# Patient Record
Sex: MALE | Race: WHITE | Employment: FULL TIME | URBAN - METROPOLITAN AREA
[De-identification: names, ages, dates, MRNs, and addresses within clinical notes are randomized per-mention and may not be internally consistent; named-entity substitution may affect disease eponyms.]

---

## 2018-03-07 PROBLEM — Z30.09 VASECTOMY EVALUATION: Status: ACTIVE | Noted: 2018-03-07

## 2021-07-15 ENCOUNTER — OFFICE VISIT (OUTPATIENT)
Dept: FAMILY MEDICINE CLINIC | Age: 51
End: 2021-07-15
Payer: COMMERCIAL

## 2021-07-15 ENCOUNTER — HOSPITAL ENCOUNTER (OUTPATIENT)
Dept: LAB | Age: 51
Discharge: HOME | End: 2021-07-15
Payer: COMMERCIAL

## 2021-07-15 VITALS
SYSTOLIC BLOOD PRESSURE: 102 MMHG | HEART RATE: 64 BPM | OXYGEN SATURATION: 97 % | HEIGHT: 69 IN | WEIGHT: 177 LBS | BODY MASS INDEX: 26.22 KG/M2 | TEMPERATURE: 98.3 F | DIASTOLIC BLOOD PRESSURE: 74 MMHG

## 2021-07-15 DIAGNOSIS — Z12.5 SCREENING FOR PROSTATE CANCER: ICD-10-CM

## 2021-07-15 DIAGNOSIS — Z00.00 ROUTINE GENERAL MEDICAL EXAMINATION AT A HEALTH CARE FACILITY: Primary | ICD-10-CM

## 2021-07-15 DIAGNOSIS — D72.829 LEUKOCYTOSIS, UNSPECIFIED TYPE: ICD-10-CM

## 2021-07-15 DIAGNOSIS — K63.89 HEPATIC FLEXURE MASS: ICD-10-CM

## 2021-07-15 DIAGNOSIS — E78.5 HYPERLIPIDEMIA, UNSPECIFIED HYPERLIPIDEMIA TYPE: ICD-10-CM

## 2021-07-15 DIAGNOSIS — Z12.11 SCREEN FOR COLON CANCER: ICD-10-CM

## 2021-07-15 LAB
ALBUMIN SERPL-MCNC: 3.9 G/DL (ref 3.4–5)
ALBUMIN/GLOB SERPL: 1.3 {RATIO} (ref 0.8–1.7)
ALP SERPL-CCNC: 92 U/L (ref 45–117)
ALT SERPL-CCNC: 22 U/L (ref 16–61)
ANION GAP SERPL CALC-SCNC: 5 MMOL/L (ref 3–18)
AST SERPL-CCNC: 16 U/L (ref 10–38)
BASOPHILS # BLD: 0 K/UL (ref 0–0.1)
BASOPHILS NFR BLD: 1 % (ref 0–2)
BILIRUB SERPL-MCNC: 0.6 MG/DL (ref 0.2–1)
BUN SERPL-MCNC: 12 MG/DL (ref 7–18)
BUN/CREAT SERPL: 11 (ref 12–20)
CALCIUM SERPL-MCNC: 8.8 MG/DL (ref 8.5–10.1)
CHLORIDE SERPL-SCNC: 112 MMOL/L (ref 100–111)
CHOLEST SERPL-MCNC: 214 MG/DL
CO2 SERPL-SCNC: 26 MMOL/L (ref 21–32)
CREAT SERPL-MCNC: 1.08 MG/DL (ref 0.6–1.3)
DIFFERENTIAL METHOD BLD: NORMAL
EOSINOPHIL # BLD: 0.2 K/UL (ref 0–0.4)
EOSINOPHIL NFR BLD: 2 % (ref 0–5)
ERYTHROCYTE [DISTWIDTH] IN BLOOD BY AUTOMATED COUNT: 12.9 % (ref 11.6–14.5)
GLOBULIN SER CALC-MCNC: 2.9 G/DL (ref 2–4)
GLUCOSE SERPL-MCNC: 96 MG/DL (ref 74–99)
HCT VFR BLD AUTO: 47.8 % (ref 36–48)
HDLC SERPL-MCNC: 42 MG/DL (ref 40–60)
HDLC SERPL: 5.1 {RATIO} (ref 0–5)
HGB BLD-MCNC: 15.3 G/DL (ref 13–16)
LDLC SERPL CALC-MCNC: 146.6 MG/DL (ref 0–100)
LIPID PROFILE,FLP: ABNORMAL
LYMPHOCYTES # BLD: 2.1 K/UL (ref 0.9–3.6)
LYMPHOCYTES NFR BLD: 33 % (ref 21–52)
MCH RBC QN AUTO: 30.4 PG (ref 24–34)
MCHC RBC AUTO-ENTMCNC: 32 G/DL (ref 31–37)
MCV RBC AUTO: 95 FL (ref 74–97)
MONOCYTES # BLD: 0.5 K/UL (ref 0.05–1.2)
MONOCYTES NFR BLD: 8 % (ref 3–10)
NEUTS SEG # BLD: 3.5 K/UL (ref 1.8–8)
NEUTS SEG NFR BLD: 56 % (ref 40–73)
PLATELET # BLD AUTO: 314 K/UL (ref 135–420)
PMV BLD AUTO: 10.3 FL (ref 9.2–11.8)
POTASSIUM SERPL-SCNC: 4.4 MMOL/L (ref 3.5–5.5)
PROT SERPL-MCNC: 6.8 G/DL (ref 6.4–8.2)
PSA SERPL-MCNC: 3 NG/ML (ref 0–4)
RBC # BLD AUTO: 5.03 M/UL (ref 4.35–5.65)
SODIUM SERPL-SCNC: 143 MMOL/L (ref 136–145)
TRIGL SERPL-MCNC: 127 MG/DL (ref ?–150)
VLDLC SERPL CALC-MCNC: 25.4 MG/DL
WBC # BLD AUTO: 6.2 K/UL (ref 4.6–13.2)

## 2021-07-15 PROCEDURE — 85025 COMPLETE CBC W/AUTO DIFF WBC: CPT

## 2021-07-15 PROCEDURE — 84153 ASSAY OF PSA TOTAL: CPT

## 2021-07-15 PROCEDURE — 99386 PREV VISIT NEW AGE 40-64: CPT | Performed by: INTERNAL MEDICINE

## 2021-07-15 PROCEDURE — 80053 COMPREHEN METABOLIC PANEL: CPT

## 2021-07-15 PROCEDURE — 36415 COLL VENOUS BLD VENIPUNCTURE: CPT

## 2021-07-15 PROCEDURE — 80061 LIPID PANEL: CPT

## 2021-07-15 RX ORDER — ATORVASTATIN CALCIUM 40 MG/1
40 TABLET, FILM COATED ORAL DAILY
Qty: 90 TABLET | Refills: 1 | Status: SHIPPED | OUTPATIENT
Start: 2021-07-15

## 2021-07-15 RX ORDER — LURASIDONE HYDROCHLORIDE 80 MG/1
80 TABLET, FILM COATED ORAL
COMMUNITY
Start: 2021-07-08

## 2021-07-15 NOTE — PROGRESS NOTES
Chief Complaint   Patient presents with    Physical     1. Have you been to the ER, urgent care clinic since your last visit? Hospitalized since your last visit? No    2. Have you seen or consulted any other health care providers outside of the [address] [place] [address] since your last visit? Include any pap smears or colon screening.  No     Health Maintenance Due   Topic Date Due    Hepatitis C Screening  Never done    COVID-19 Vaccine (1) Never done    DTaP/Tdap/Td series (1 - Tdap) Never done    Colorectal Cancer Screening Combo  Never done    Lipid Screen  08/05/2020

## 2021-07-15 NOTE — PROGRESS NOTES
HISTORY OF PRESENT ILLNESS  Shyanne Pacheco is a 53 y.o. male Presents today for a complete physical and preventative medicine exam.  Past medical history is significant for: Kidney stone and hyperlipidemia hepatic mass  Last colonoscopy never  Last eye examination last year  Last dental exam last year  Last PSA never      Physical  The history is provided by the patient and medical records. No Known Allergies  Current Outpatient Medications on File Prior to Visit   Medication Sig Dispense Refill    Latuda 80 mg tab tablet Take 80 mg by mouth daily (with breakfast).  methylphenidate HCl (RITALIN) 20 mg tablet take 1 tablet by mouth three times a day if needed  0    lamoTRIgine (LaMICtaL) 200 mg tablet Take 200 mg by mouth four (4) times daily. No current facility-administered medications on file prior to visit.      Past Medical History:   Diagnosis Date    ADHD (attention deficit hyperactivity disorder)     [facility] ([facility]     Encounter for vasectomy     Hydronephrosis     Kidney stones     Stroke ([facility]     Ureterolithiasis      Past Surgical History:   Procedure Laterality Date    HX COLONOSCOPY          HX VASECTOMY Bilateral 03/15/2018    Dr.Brugh MARC     Family History   Problem Relation Age of Onset    Hypertension Mother     Stroke Father     Cancer Father         thyroid    Heart Failure Father     Cancer Paternal Grandfather         colon     Social History     Socioeconomic History    Marital status:      Spouse name: Not on file    Number of children: Not on file    Years of education: Not on file    Highest education level: Not on file   Occupational History    Occupation:    Tobacco Use    Smoking status: Former Smoker     Quit date: 2005     Years since quittin.5    Smokeless tobacco: Never Used   Vaping Use    Vaping Use: Never used   Substance and Sexual Activity    Alcohol use: No     Comment: occ    Drug use: No    Sexual activity: Yes     Partners: Female     Birth control/protection: Condom   Other Topics Concern    Not on file   Social History Narrative    Not on file     Social Determinants of Health     Financial Resource Strain:     Difficulty of Paying Living Expenses:    Food Insecurity:     Worried About Running Out of Food in the Last Year:     [address] in the Last Year:    Transportation Needs:     Lack of Transportation (Medical):  Lack of Transportation (Non-Medical):    Physical Activity:     Days of Exercise per Week:     Minutes of Exercise per Session:    Stress:     Feeling of Stress :    Social Connections:     Frequency of Communication with Friends and Family:     Frequency of Social Gatherings with Friends and Family:     Attends [employer] Services:     Active Member of Clubs or Organizations:     Attends Club or Organization Meetings:     Marital Status:    Intimate Partner Violence:     Fear of Current or Ex-Partner:     Emotionally Abused:     Physically Abused:     Sexually Abused:          Review of Systems   Constitutional: Negative. Eyes: Negative. Respiratory: Negative. Cardiovascular: Negative. Musculoskeletal: Negative. Neurological: Negative. Endo/Heme/Allergies: Negative. Psychiatric/Behavioral: Negative. Visit Vitals  /74   Pulse 64   Temp 98.3 °F (36.8 °C) (Temporal)   Ht 5' 9\" (1.753 m)   Wt 177 lb (80.3 kg)   SpO2 97%   BMI 26.14 kg/m²       Physical Exam  Vitals and nursing note reviewed. Constitutional:       Appearance: He is well-developed. HENT:      Head: Normocephalic and atraumatic. Right Ear: External ear normal.      Left Ear: External ear normal.      Nose: Nose normal.      Mouth/Throat:      Pharynx: No oropharyngeal exudate. Eyes:      General: No scleral icterus. Right eye: No discharge. Left eye: No discharge.       Conjunctiva/sclera: Conjunctivae normal.      Pupils: Pupils are equal, round, and reactive to light. Neck:      Thyroid: No thyromegaly. Vascular: No JVD. Trachea: No tracheal deviation. Cardiovascular:      Rate and Rhythm: Normal rate and regular rhythm. Heart sounds: Normal heart sounds. No murmur heard. No friction rub. No gallop. Pulmonary:      Effort: Pulmonary effort is normal. No respiratory distress. Breath sounds: Normal breath sounds. No wheezing or rales. Chest:      Chest wall: No tenderness. Abdominal:      General: Bowel sounds are normal. There is no distension. Palpations: Abdomen is soft. There is no mass. Tenderness: There is no abdominal tenderness. There is no guarding or rebound. Musculoskeletal:         General: No tenderness or deformity. Normal range of motion. Cervical back: Normal range of motion and neck supple. Lymphadenopathy:      Cervical: No cervical adenopathy. Skin:     General: Skin is warm and dry. Coloration: Skin is not pale. Findings: No erythema or rash. Neurological:      Mental Status: He is alert and oriented to person, place, and time. Cranial Nerves: No cranial nerve deficit. Coordination: Coordination normal.   Psychiatric:         Behavior: Behavior normal.         Thought Content: Thought content normal.         Judgment: Judgment normal.         ASSESSMENT and PLAN    ICD-10-CM ICD-9-CM    1. Routine general medical examination at a health care facility  Z00.00 V70.0    2. Hepatic flexure mass  K63.89 569.89 MRI ABD W WO CONT   3. Screening for prostate cancer  Z12.5 V76.44 PSA SCREENING (SCREENING)   4. Screen for colon cancer  Z12.11 V76.51 REFERRAL TO GASTROENTEROLOGY   5. Hyperlipidemia, unspecified hyperlipidemia type  E78.5 272.4 LIPID PANEL   6. Leukocytosis, unspecified type  D72.829 288.60 CBC WITH AUTOMATED DIFF     Follow-up and Dispositions    · Return in about 6 weeks (around 8/26/2021).

## 2022-03-19 PROBLEM — Z30.09 VASECTOMY EVALUATION: Status: ACTIVE | Noted: 2018-03-07

## 2022-04-07 ENCOUNTER — HOSPITAL ENCOUNTER (OUTPATIENT)
Dept: LAB | Age: 52
Discharge: HOME | End: 2022-04-07
Payer: COMMERCIAL

## 2022-04-07 ENCOUNTER — OFFICE VISIT (OUTPATIENT)
Dept: FAMILY MEDICINE CLINIC | Age: 52
End: 2022-04-07
Payer: COMMERCIAL

## 2022-04-07 VITALS
BODY MASS INDEX: 26.57 KG/M2 | WEIGHT: 179.4 LBS | RESPIRATION RATE: 18 BRPM | HEART RATE: 75 BPM | HEIGHT: 69 IN | SYSTOLIC BLOOD PRESSURE: 118 MMHG | TEMPERATURE: 98.2 F | DIASTOLIC BLOOD PRESSURE: 77 MMHG | OXYGEN SATURATION: 97 %

## 2022-04-07 DIAGNOSIS — E78.5 HYPERLIPIDEMIA, UNSPECIFIED HYPERLIPIDEMIA TYPE: Primary | ICD-10-CM

## 2022-04-07 DIAGNOSIS — K63.89 HEPATIC FLEXURE MASS: ICD-10-CM

## 2022-04-07 DIAGNOSIS — F31.81 BIPOLAR 2 DISORDER (HCC): ICD-10-CM

## 2022-04-07 DIAGNOSIS — Z12.11 COLON CANCER SCREENING: ICD-10-CM

## 2022-04-07 DIAGNOSIS — E78.5 HYPERLIPIDEMIA, UNSPECIFIED HYPERLIPIDEMIA TYPE: ICD-10-CM

## 2022-04-07 PROBLEM — Z30.09 VASECTOMY EVALUATION: Status: RESOLVED | Noted: 2018-03-07 | Resolved: 2022-04-07

## 2022-04-07 LAB
ALBUMIN SERPL-MCNC: 3.9 G/DL (ref 3.4–5)
ALBUMIN/GLOB SERPL: 1.3 {RATIO} (ref 0.8–1.7)
ALP SERPL-CCNC: 82 U/L (ref 45–117)
ALT SERPL-CCNC: 20 U/L (ref 16–61)
ANION GAP SERPL CALC-SCNC: 7 MMOL/L (ref 3–18)
AST SERPL-CCNC: 16 U/L (ref 10–38)
BILIRUB SERPL-MCNC: 0.4 MG/DL (ref 0.2–1)
BUN SERPL-MCNC: 12 MG/DL (ref 7–18)
BUN/CREAT SERPL: 10 (ref 12–20)
CALCIUM SERPL-MCNC: 9.4 MG/DL (ref 8.5–10.1)
CHLORIDE SERPL-SCNC: 104 MMOL/L (ref 100–111)
CHOLEST SERPL-MCNC: 191 MG/DL
CO2 SERPL-SCNC: 28 MMOL/L (ref 21–32)
CREAT SERPL-MCNC: 1.18 MG/DL (ref 0.6–1.3)
GLOBULIN SER CALC-MCNC: 3 G/DL (ref 2–4)
GLUCOSE SERPL-MCNC: 75 MG/DL (ref 74–99)
HDLC SERPL-MCNC: 41 MG/DL (ref 40–60)
HDLC SERPL: 4.7 {RATIO} (ref 0–5)
LDLC SERPL CALC-MCNC: 110.8 MG/DL (ref 0–100)
LIPID PROFILE,FLP: ABNORMAL
POTASSIUM SERPL-SCNC: 4.2 MMOL/L (ref 3.5–5.5)
PROT SERPL-MCNC: 6.9 G/DL (ref 6.4–8.2)
SODIUM SERPL-SCNC: 139 MMOL/L (ref 136–145)
TRIGL SERPL-MCNC: 196 MG/DL (ref ?–150)
VLDLC SERPL CALC-MCNC: 39.2 MG/DL

## 2022-04-07 PROCEDURE — 80053 COMPREHEN METABOLIC PANEL: CPT

## 2022-04-07 PROCEDURE — 99214 OFFICE O/P EST MOD 30 MIN: CPT | Performed by: NURSE PRACTITIONER

## 2022-04-07 PROCEDURE — 36415 COLL VENOUS BLD VENIPUNCTURE: CPT

## 2022-04-07 PROCEDURE — 80061 LIPID PANEL: CPT

## 2022-04-07 NOTE — PROGRESS NOTES
[address] [place] [address]               [phone]      Harini Marcelo is a 49 y.o. male and presents with Establish Care       Assessment/Plan:    Diagnoses and all orders for this visit:    1. Hyperlipidemia, unspecified hyperlipidemia type  -     METABOLIC PANEL, COMPREHENSIVE; Future  -     LIPID PANEL; Future   Endorses medication compliance, Follow-up labs today and Denies abdominal pain or symptoms of myalgia   2. Hepatic flexure mass  -     CT ABD W CONT; Future  -     REFERRAL TO GASTROENTEROLOGY  Incidental finding on a prior CAT scan, will get repeat CAT scan to follow-up and refer to gastroenterology for further evaluation in addition to colon cancer screening  3. Colon cancer screening  -     REFERRAL TO GASTROENTEROLOGY    4. Bipolar 2 disorder ([facility]  Assessment & Plan:   monitored by specialist. No acute findings meriting change in the plan      Visit today to establish care, prior patient of Dr. Mary Grace Nice  Follow-up and Dispositions    · Return in about 4 months (around 8/7/2022) for CPE, 30 min, office only. Health Maintenance:   Health Maintenance   Topic Date Due    Hepatitis C Screening  Never done    DTaP/Tdap/Td series (1 - Tdap) Never done    Colorectal Cancer Screening Combo  Never done    Shingrix Vaccine Age 50> (1 of 2) Never done    COVID-19 Vaccine (3 - Booster for [employer] series) 03/01/2022    Flu Vaccine (Season Ended) 09/01/2022    Depression Monitoring  04/07/2023    Lipid Screen  04/07/2023    Pneumococcal 0-64 years  Aged Out        Subjective:    Labs obtained prior to visit? NO  Reviewed with patient?  Not applicable    Weak urine stream  Was concerned of prostate cancer  Last PSA 3.0  PMH: kidney stones x2, last was a year ago  Onset: about 2 months ago  The problem resolved on its own  Denies having pain or discomfot with urination at the time    Foreign object in foot  Stepped on glass about a month ago  Thought he got it out but feels like something is still there  Advised he should go to urgent care as we do not do procedures in our office    Hepatic mass  Found incidentally on ct scan in 2019  Needs follow up  Denies abdominal pain or changes in bowel habits  Get ct scan and refer to gi      ROS:     ROS  As stated in HPI, otherwise all others negative. The problem list was updated as a part of today's visit. Patient Active Problem List   Diagnosis Code    TIA (transient ischemic attack) G45.9    ADHD (attention deficit hyperactivity disorder) F90.9    [facility] ([facility] F31.81       The PSH, FH were reviewed. SH:  Social History     Tobacco Use    Smoking status: Former Smoker     Quit date: 2005     Years since quittin.3    Smokeless tobacco: Never Used   Vaping Use    Vaping Use: Never used   Substance Use Topics    Alcohol use: No     Comment: occ    Drug use: No       Medications/Allergies:  Current Outpatient Medications on File Prior to Visit   Medication Sig Dispense Refill    Latuda 80 mg tab tablet Take 80 mg by mouth daily (with breakfast).  methylphenidate HCl (RITALIN) 20 mg tablet take 1 tablet by mouth three times a day if needed  0    lamoTRIgine (LaMICtaL) 200 mg tablet Take 200 mg by mouth four (4) times daily.  atorvastatin (LIPITOR) 40 mg tablet Take 1 Tablet by mouth daily. (Patient not taking: Reported on 2022) 90 Tablet 1     No current facility-administered medications on file prior to visit. No Known Allergies    Objective:  Visit Vitals  /77 (BP 1 Location: Right arm, BP Patient Position: Sitting, BP Cuff Size: Small adult) Comment (BP Patient Position): feet flat on the floor   Pulse 75   Temp 98.2 °F (36.8 °C) (Temporal)   Resp 18   Ht 5' 9\" (1.753 m)   Wt 179 lb 6.4 oz (81.4 kg)   SpO2 97%   BMI 26.49 kg/m²    Body mass index is 26.49 kg/m². Physical assessment  Physical Exam  Vitals and nursing note reviewed.    Eyes: Conjunctiva/sclera: Conjunctivae normal.      Pupils: Pupils are equal, round, and reactive to light. Cardiovascular:      Rate and Rhythm: Normal rate and regular rhythm. Heart sounds: Normal heart sounds. No murmur heard. No friction rub. No gallop. Pulmonary:      Effort: Pulmonary effort is normal.      Breath sounds: Normal breath sounds. Musculoskeletal:         General: Normal range of motion. Cervical back: Normal range of motion. Skin:     General: Skin is warm and dry. Neurological:      Mental Status: He is alert. Labwork and Ancillary Studies:    CBC w/Diff  Lab Results   Component Value Date/Time    WBC 6.2 07/15/2021 09:38 AM    HGB 15.3 07/15/2021 09:38 AM    PLATELET 539 19/29/4783 09:38 AM         Basic Metabolic Profile  Lab Results   Component Value Date/Time    Sodium 139 04/07/2022 11:14 AM    Potassium 4.2 04/07/2022 11:14 AM    Chloride 104 04/07/2022 11:14 AM    CO2 28 04/07/2022 11:14 AM    Anion gap 7 04/07/2022 11:14 AM    Glucose 75 04/07/2022 11:14 AM    BUN 12 04/07/2022 11:14 AM    Creatinine 1.18 04/07/2022 11:14 AM    BUN/Creatinine ratio 10 (L) 04/07/2022 11:14 AM    GFR est AA >60 04/07/2022 11:14 AM    GFR est non-AA >60 04/07/2022 11:14 AM    Calcium 9.4 04/07/2022 11:14 AM        Cholesterol  Lab Results   Component Value Date/Time    Cholesterol, total 191 04/07/2022 11:14 AM    HDL Cholesterol 41 04/07/2022 11:14 AM    LDL, calculated 110.8 (H) 04/07/2022 11:14 AM    Triglyceride 196 (H) 04/07/2022 11:14 AM    CHOL/HDL Ratio 4.7 04/07/2022 11:14 AM           I have discussed the diagnosis with the patient and the intended plan as seen in the above orders. The patient has received an After-Visit Summary and questions were answered concerning future plans. An After Visit Summary was printed and given to the patient. All diagnosis have been discussed with the patient and all of the patient's questions have been answered.      Follow-up and Dispositions    · Return in about 4 months (around 8/7/2022) for CPE, 30 min, office only. Judith Montejo, [facility]-BC  [address] [facility]   [address] [facility] [address]  [address]

## 2022-04-07 NOTE — PROGRESS NOTES
Room 8    Patient has no open referrals    Patient states I would like to discuss referral to Liver specialist .     Did patient bring someone? No    Did the patient have DME equipment? No    Did you take your medication today? Yes      1. \"Have you been to the ER, urgent care clinic since your last visit? Hospitalized since your last visit? \" No    2. \"Have you seen or consulted any other health care providers outside of the [address] [place] [address] since your last visit? \" No     3. For patients aged 39-70: Has the patient had a colonoscopy / FIT/ Cologuard? No      If the patient is female:    4. For patients aged 41-77: Has the patient had a mammogram within the past 2 years? No      5. For patients aged 21-65: Has the patient had a pap smear?  No        3 most recent PHQ Screens 4/7/2022   PHQ Not Done -   Little interest or pleasure in doing things Not at all   Feeling down, depressed, irritable, or hopeless Not at all   Total Score PHQ 2 0         Health Maintenance Due   Topic Date Due    Hepatitis C Screening  Never done    COVID-19 Vaccine (1) Never done    DTaP/Tdap/Td series (1 - Tdap) Never done    Colorectal Cancer Screening Combo  Never done    Shingrix Vaccine Age 50> (1 of 2) Never done       Learning Assessment 8/5/2015   PRIMARY LEARNER Patient   HIGHEST LEVEL OF EDUCATION - PRIMARY LEARNER  4 YEARS OF COLLEGE   PRIMARY LANGUAGE ENGLISH   LEARNER PREFERENCE PRIMARY READING   ANSWERED BY patient   RELATIONSHIP SELF

## 2022-04-11 PROBLEM — Z30.09 VASECTOMY EVALUATION: Status: RESOLVED | Noted: 2018-03-07 | Resolved: 2022-04-07

## 2022-04-11 PROBLEM — F31.81 BIPOLAR 2 DISORDER (HCC): Status: ACTIVE | Noted: 2022-04-07

## 2022-04-15 ENCOUNTER — TELEPHONE (OUTPATIENT)
Dept: FAMILY MEDICINE CLINIC | Age: 52
End: 2022-04-15

## 2022-04-20 ENCOUNTER — PATIENT MESSAGE (OUTPATIENT)
Dept: FAMILY MEDICINE CLINIC | Age: 52
End: 2022-04-20

## 2022-05-16 ENCOUNTER — DOCUMENTATION ONLY (OUTPATIENT)
Dept: FAMILY MEDICINE CLINIC | Age: 52
End: 2022-05-16

## 2022-05-16 ENCOUNTER — TELEPHONE (OUTPATIENT)
Dept: FAMILY MEDICINE CLINIC | Age: 52
End: 2022-05-16

## 2022-05-16 ENCOUNTER — OFFICE VISIT (OUTPATIENT)
Dept: FAMILY MEDICINE CLINIC | Age: 52
End: 2022-05-16
Payer: COMMERCIAL

## 2022-05-16 VITALS
SYSTOLIC BLOOD PRESSURE: 122 MMHG | OXYGEN SATURATION: 98 % | RESPIRATION RATE: 18 BRPM | TEMPERATURE: 97.6 F | WEIGHT: 177 LBS | BODY MASS INDEX: 26.22 KG/M2 | HEART RATE: 72 BPM | DIASTOLIC BLOOD PRESSURE: 76 MMHG | HEIGHT: 69 IN

## 2022-05-16 DIAGNOSIS — R07.89 COSTOCHONDRAL CHEST PAIN: Primary | ICD-10-CM

## 2022-05-16 PROCEDURE — 99213 OFFICE O/P EST LOW 20 MIN: CPT | Performed by: NURSE PRACTITIONER

## 2022-05-16 NOTE — PROGRESS NOTES
[address] [place] [address]               [phone]      Elaina Baez is a 49 y.o. male and presents with Hospital Follow Up and Chest Pain       Assessment/Plan:    Diagnoses and all orders for this visit:    1. Costochondral chest pain    went to ED for this pain and had negative cardiac workup  Signs and symptoms are consistent with costochondritis  Advised to use OTC NSAIDS for his pain and hand out on stretches and exercises to perform at home provided  Patient verbalized understanding and is in agreement with this plan of care    Follow-up and Dispositions    · Return for keep previously scheduled follow up. Health Maintenance:   Health Maintenance   Topic Date Due    Hepatitis C Screening  Never done    DTaP/Tdap/Td series (1 - Tdap) Never done    Colorectal Cancer Screening Combo  Never done    Shingrix Vaccine Age 50> (1 of 2) Never done    COVID-19 Vaccine (3 - Booster for [employer] series) 03/01/2022    Flu Vaccine (Season Ended) 09/01/2022    Depression Monitoring  04/07/2023    Lipid Screen  04/07/2023    Pneumococcal 0-64 years  Aged Out        Subjective:    Labs obtained prior to visit? NO  Reviewed with patient? Not applicable    Chest pain  Ongoing for about 3 weeks  Has called EMS for this problem and was taken to ED  While there he had testing for MI and was told he was negative (records not available at this time)  Went to urgent care this morning for the same problem  Had EKG and CXR: both were negative for abnormalities  Recommended stress reduciton techniques    Wants to find out why his chest is hurting all the time   States the pain is mid-sternal: varies from a pain to a pressure  The pain is worse if he is winded and takes a deep breath        ROS:     ROS  As stated in HPI, otherwise all others negative. The problem list was updated as a part of today's visit.   Patient Active Problem List   Diagnosis Code    TIA (transient ischemic attack) G45.9    ADHD (attention deficit hyperactivity disorder) F90.9    Bipolar 2 disorder ([facility]) F31.81       The PSH, FH were reviewed. SH:  Social History     Tobacco Use    Smoking status: Former Smoker     Quit date: 2005     Years since quittin.4    Smokeless tobacco: Never Used   Vaping Use    Vaping Use: Never used   Substance Use Topics    Alcohol use: No     Comment: occ    Drug use: No       Medications/Allergies:  Current Outpatient Medications on File Prior to Visit   Medication Sig Dispense Refill    Latuda 80 mg tab tablet Take 80 mg by mouth daily (with breakfast).  methylphenidate HCl (RITALIN) 20 mg tablet take 1 tablet by mouth three times a day if needed  0    lamoTRIgine (LaMICtaL) 200 mg tablet Take 200 mg by mouth four (4) times daily.  atorvastatin (LIPITOR) 40 mg tablet Take 1 Tablet by mouth daily. (Patient not taking: Reported on 2022) 90 Tablet 1     No current facility-administered medications on file prior to visit. No Known Allergies    Objective:  Visit Vitals  /76 (BP 1 Location: Left arm, BP Patient Position: Sitting, BP Cuff Size: Adult)   Pulse 72   Temp 97.6 °F (36.4 °C) (Temporal)   Resp 18   Ht 5' 9\" (1.753 m)   Wt 177 lb (80.3 kg)   SpO2 98%   BMI 26.14 kg/m²    Body mass index is 26.14 kg/m². Physical assessment  Physical Exam  Vitals and nursing note reviewed. Eyes:      Conjunctiva/sclera: Conjunctivae normal.      Pupils: Pupils are equal, round, and reactive to light. Cardiovascular:      Rate and Rhythm: Normal rate and regular rhythm. Heart sounds: Normal heart sounds. No murmur heard. No friction rub. No gallop. Pulmonary:      Effort: Pulmonary effort is normal.      Breath sounds: Normal breath sounds. Musculoskeletal:         General: Normal range of motion. Cervical back: Normal range of motion. Skin:     General: Skin is warm and dry.    Neurological:      Mental Status: He is alert. Labwork and Ancillary Studies:    CBC w/Diff  Lab Results   Component Value Date/Time    WBC 6.2 07/15/2021 09:38 AM    HGB 15.3 07/15/2021 09:38 AM    PLATELET 194 58/51/0591 09:38 AM         Basic Metabolic Profile  Lab Results   Component Value Date/Time    Sodium 139 04/07/2022 11:14 AM    Potassium 4.2 04/07/2022 11:14 AM    Chloride 104 04/07/2022 11:14 AM    CO2 28 04/07/2022 11:14 AM    Anion gap 7 04/07/2022 11:14 AM    Glucose 75 04/07/2022 11:14 AM    BUN 12 04/07/2022 11:14 AM    Creatinine 1.18 04/07/2022 11:14 AM    BUN/Creatinine ratio 10 (L) 04/07/2022 11:14 AM    GFR est AA >60 04/07/2022 11:14 AM    GFR est non-AA >60 04/07/2022 11:14 AM    Calcium 9.4 04/07/2022 11:14 AM        Cholesterol  Lab Results   Component Value Date/Time    Cholesterol, total 191 04/07/2022 11:14 AM    HDL Cholesterol 41 04/07/2022 11:14 AM    LDL, calculated 110.8 (H) 04/07/2022 11:14 AM    Triglyceride 196 (H) 04/07/2022 11:14 AM    CHOL/HDL Ratio 4.7 04/07/2022 11:14 AM           I have discussed the diagnosis with the patient and the intended plan as seen in the above orders. The patient has received an After-Visit Summary and questions were answered concerning future plans. An After Visit Summary was printed and given to the patient. All diagnosis have been discussed with the patient and all of the patient's questions have been answered. Follow-up and Dispositions    · Return for keep previously scheduled follow up. Faraz Charles, AGNP-BC  [address] [facility]   [address] [facility] [address]  [address]

## 2022-05-16 NOTE — PROGRESS NOTES
Room 9    Patient states my chest is bother me for the past 2-3 weeks     Patient states when I called the Ambulance to my home my blood pressure was roughly 122/110. Did patient bring someone? No    Did the patient have DME equipment? No     Did you take your medication today? Yes       1. \"Have you been to the ER, urgent care clinic since your last visit? Hospitalized since your last visit? \" Yes Patient states I went to [facility] for chest pain     2. \"Have you seen or consulted any other health care providers outside of the [address] [place] [address] since your last visit? \" No     3. For patients aged 39-70: Has the patient had a colonoscopy / FIT/ Cologuard? No      If the patient is female:    4. For patients aged 41-77: Has the patient had a mammogram within the past 2 years? NA - based on age or sex      11. For patients aged 21-65: Has the patient had a pap smear?  NA - based on age or sex        3 most recent PHQ Screens 4/7/2022   PHQ Not Done -   Little interest or pleasure in doing things Not at all   Feeling down, depressed, irritable, or hopeless Not at all   Total Score PHQ 2 0         Health Maintenance Due   Topic Date Due    Hepatitis C Screening  Never done    DTaP/Tdap/Td series (1 - Tdap) Never done    Colorectal Cancer Screening Combo  Never done    Shingrix Vaccine Age 50> (1 of 2) Never done    COVID-19 Vaccine (3 - Booster for [employer] series) 03/01/2022       Learning Assessment 8/5/2015   PRIMARY LEARNER Patient   HIGHEST LEVEL OF EDUCATION - PRIMARY LEARNER  4 YEARS OF COLLEGE   PRIMARY LANGUAGE ENGLISH   LEARNER PREFERENCE PRIMARY READING   ANSWERED BY patient   RELATIONSHIP SELF

## 2022-05-16 NOTE — PATIENT INSTRUCTIONS
Costochondritis: Care Instructions  Your Care Instructions  You have chest pain because the cartilage of your rib cage is inflamed. This problem is called costochondritis. This type of chest wall pain may last from days to weeks. It is not a heart problem. Sometimes costochondritis occurs with a cold or the flu, and other times the exact cause is not known. Follow-up care is a key part of your treatment and safety. Be sure to make and go to all appointments, and call your doctor if you are having problems. It's also a good idea to know your test results and keep a list of the medicines you take. How can you care for yourself at home? · Take medicines for pain and inflammation exactly as directed. ? If the doctor gave you a prescription medicine, take it as prescribed. ? If you are not taking a prescription pain medicine, ask your doctor if you can take an over-the-counter medicine. ? Do not take two or more pain medicines at the same time unless the doctor told you to. Many pain medicines have acetaminophen, which is Tylenol. Too much acetaminophen (Tylenol) can be harmful. · It may help to use a warm compress or heating pad (set on low) on your chest. You can also try alternating heat and ice. Put ice or a cold pack on the area for 10 to 20 minutes at a time. Put a thin cloth between the ice and your skin. · Avoid any activity that strains the chest area. As your pain gets better, you can slowly return to your normal activities. · Do not use tape, an elastic bandage, a \"rib belt,\" or anything else that restricts your chest wall motion. When should you call for help? Call 911 anytime you think you may need emergency care. For example, call if:    · You have new or different chest pain or pressure. This may occur with:  ? Sweating. ? Shortness of breath. ? Nausea or vomiting. ? Pain that spreads from the chest to the neck, jaw, or one or both shoulders or arms.   ? Dizziness or lightheadedness. ? A fast or uneven pulse. After calling 911, chew 1 adult-strength aspirin. Wait for an ambulance. Do not try to drive yourself.     · You have severe trouble breathing. Call your doctor now or seek immediate medical care if:    · You have a fever or cough.     · You have any trouble breathing.     · Your chest pain gets worse. Watch closely for changes in your health, and be sure to contact your doctor if:    · Your chest pain continues even though you are taking anti-inflammatory medicine.     · Your chest wall pain has not improved after 5 to 7 days. Where can you learn more? Go to http://www.gray.com/  Enter [id number] in the search box to learn more about \"Costochondritis: Care Instructions. \"  Current as of: July 1, 2021               Content Version: 13.2  © 7785-6478 [employer]. Care instructions adapted under license by [employer] (which disclaims liability or warranty for this information). If you have questions about a medical condition or this instruction, always ask your healthcare professional. [place] [address] any warranty or liability for your use of this information. Shoulder Stretches: Exercises  Introduction  Here are some examples of exercises for you to try. The exercises may be suggested for a condition or for rehabilitation. Start each exercise slowly. Ease off the exercises if you start to have pain. You will be told when to start these exercises and which ones will work best for you. How to do the exercises  Shoulder stretch    1.  a doorway and place one arm against the door frame. Your elbow should be a little higher than your shoulder. 2. Relax your shoulders as you lean forward, allowing your chest and shoulder muscles to stretch. You can also turn your body slightly away from your arm to stretch the muscles even more. 3. Hold for 15 to 30 seconds.   4. Repeat 2 to 4 times with each arm. Shoulder and chest stretch    1. Shoulder and chest stretch  2. While sitting, relax your upper body so you slump slightly in your chair. 3. As you breathe in, straighten your back and open your arms out to the sides. 4. Gently pull your shoulder blades back and downward. 5. Hold for 15 to 30 seconds as your breathe normally. 6. Repeat 2 to 4 times. Overhead stretch    1. Reach up over your head with both arms. 2. Hold for 15 to 30 seconds. 3. Repeat 2 to 4 times. Follow-up care is a key part of your treatment and safety. Be sure to make and go to all appointments, and call your doctor if you are having problems. It's also a good idea to know your test results and keep a list of the medicines you take. Where can you learn more? Go to http://www.gray.com/  Enter S254 in the search box to learn more about \"Shoulder Stretches: Exercises. \"  Current as of: July 1, 2021               Content Version: 13.2  © 2006-2022 [employer]. Care instructions adapted under license by [employer] (which disclaims liability or warranty for this information). If you have questions about a medical condition or this instruction, always ask your healthcare professional. [address] any warranty or liability for your use of this information.

## 2022-08-22 DIAGNOSIS — K63.89 HEPATIC FLEXURE MASS: ICD-10-CM

## 2022-12-05 ENCOUNTER — OFFICE VISIT (OUTPATIENT)
Dept: FAMILY MEDICINE CLINIC | Age: 52
End: 2022-12-05
Payer: COMMERCIAL

## 2022-12-05 VITALS
DIASTOLIC BLOOD PRESSURE: 82 MMHG | HEIGHT: 69 IN | HEART RATE: 101 BPM | BODY MASS INDEX: 27.25 KG/M2 | RESPIRATION RATE: 18 BRPM | TEMPERATURE: 98.4 F | WEIGHT: 184 LBS | OXYGEN SATURATION: 100 % | SYSTOLIC BLOOD PRESSURE: 110 MMHG

## 2022-12-05 DIAGNOSIS — R05.3 CHRONIC COUGH: Primary | ICD-10-CM

## 2022-12-05 DIAGNOSIS — R07.89 CHEST WALL PAIN: ICD-10-CM

## 2022-12-05 PROBLEM — E78.5 HYPERLIPIDEMIA: Status: ACTIVE | Noted: 2022-12-05

## 2022-12-05 PROBLEM — E78.5 HYPERLIPIDEMIA: Status: RESOLVED | Noted: 2022-12-05 | Resolved: 2022-12-05

## 2022-12-05 PROCEDURE — 99214 OFFICE O/P EST MOD 30 MIN: CPT | Performed by: NURSE PRACTITIONER

## 2022-12-05 PROCEDURE — 93000 ELECTROCARDIOGRAM COMPLETE: CPT | Performed by: NURSE PRACTITIONER

## 2022-12-05 RX ORDER — ALBUTEROL SULFATE 90 UG/1
2 AEROSOL, METERED RESPIRATORY (INHALATION)
Qty: 18 G | Refills: 2 | Status: SHIPPED | OUTPATIENT
Start: 2022-12-05

## 2022-12-05 NOTE — PROGRESS NOTES
[address] [place], [place] [address] [address]               [phone]      Chaim Jon is a 47 y.o. male and presents with Physical       Assessment/Plan:    Diagnoses and all orders for this visit:    1. Chronic cough  -     XR CHEST PA LAT; Future  -     REFERRAL TO PULMONARY DISEASE  -     albuterol (PROVENTIL HFA, VENTOLIN HFA, PROAIR HFA) 90 mcg/actuation inhaler; Take 2 Puffs by inhalation every four (4) hours as needed for Wheezing. Check cxr, refer to pulmonary, signs and symptoms are consistent with possible asthma, order for inhaler provided  2. Chest wall pain  -     AMB POC EKG ROUTINE W/ 12 LEADS, INTER & REP  -     REFERRAL TO CARDIOLOGY  -     TROPONIN-HIGH SENSITIVITY; Future  12 leasd show NSR, to ST or T wave abnormalities: reviewed by me  Check troponin-he is having some chest discomfort now  Refer to cardiology    Labs at visit  Follow-up and Dispositions    Return in about 3 months (around 3/5/2023) for CPE, 30 min, office only. Health Maintenance:   Health Maintenance   Topic Date Due    DTaP/Tdap/Td series (1 - Tdap) Never done    Shingrix Vaccine Age 50> (1 of 2) Never done    COVID-19 Vaccine (3 - Booster for Pfizer series) 11/26/2021    Flu Vaccine (1) Never done    Lipid Screen  04/07/2023    Depression Monitoring  12/05/2023    Colorectal Cancer Screening Combo  06/27/2032    Hepatitis C Screening  Completed    Pneumococcal 0-64 years  Aged Out        Subjective:    Labs obtained prior to visit? NO  Reviewed with patient? Not applicable    Chronic cough  Onset: 6 months ago  Characteristics: happens after going up a flight of stairs, usually lasts for a short period of time but at times will last for an hour. Used to be a runner about a year ago and now cannot go up a flight of stairs without being winded. The cough is dry and non productive. Denies any phlegm or bloody sputum.   Denies sob at any other times, changes in weather makes no difference in his cough  PMH: 12 pack year history of cigarette smoking, and about 10 year history of casual smoking of 1-2 packs a month  Went to ED 10/2021 for chest pain work up negative for cardiac findings    Chest pain  Continues to have chest pain, located to left upper chest, the problem is intermittent  Started having chest pain this morning around 6-7 am when he woke up, he woke up and the pain was there, the pain did not wake him up  The pain is dull and localized, does not feel sob at this time    Hepatic flexure mass  Went to GI  Had colonoscopy and ct abd   Colonoscopy in 10 year  Ct scan showed mass unchanged and no follow up needed    ROS:     ROS  As stated in HPI, otherwise all others negative. The problem list was updated as a part of today's visit. Patient Active Problem List   Diagnosis Code    TIA (transient ischemic attack) G45.9    ADHD (attention deficit hyperactivity disorder) F90.9    Bipolar 2 disorder (HCC) F31.81       The PSH, FH were reviewed. SH:  Social History     Tobacco Use    Smoking status: Former     Packs/day: 1.00     Years: 12.00     Pack years: 12.00     Types: Cigarettes     Start date: 1993     Quit date: 2005     Years since quittin.9    Smokeless tobacco: Never    Tobacco comments:     From  till  smoked about 2 packs a montn   Vaping Use    Vaping Use: Never used   Substance Use Topics    Alcohol use: No     Comment: occ    Drug use: No       Medications/Allergies:  Current Outpatient Medications on File Prior to Visit   Medication Sig Dispense Refill    Latuda 80 mg tab tablet Take 80 mg by mouth daily (with breakfast). methylphenidate HCl (RITALIN) 20 mg tablet take 1 tablet by mouth three times a day if needed  0    lamoTRIgine (LaMICtal) 200 mg tablet Take 200 mg by mouth four (4) times daily. [DISCONTINUED] atorvastatin (LIPITOR) 40 mg tablet Take 1 Tablet by mouth daily.  (Patient not taking: No sig reported) 90 Tablet 1     No current facility-administered medications on file prior to visit. No Known Allergies    Objective:  Visit Vitals  /82   Pulse (!) 101   Temp 98.4 °F (36.9 °C) (Temporal)   Resp 18   Ht 5' 9\" (1.753 m)   Wt 184 lb (83.5 kg)   SpO2 100%   BMI 27.17 kg/m²    Body mass index is 27.17 kg/m². Physical assessment  Physical Exam  Vitals and nursing note reviewed. Eyes:      Conjunctiva/sclera: Conjunctivae normal.      Pupils: Pupils are equal, round, and reactive to light. Cardiovascular:      Rate and Rhythm: Normal rate and regular rhythm. Heart sounds: Normal heart sounds. No murmur heard. No friction rub. No gallop. Pulmonary:      Effort: Pulmonary effort is normal.      Breath sounds: Normal breath sounds. Musculoskeletal:         General: Normal range of motion. Cervical back: Normal range of motion. Skin:     General: Skin is warm and dry. Neurological:      Mental Status: He is alert.          Labwork and Ancillary Studies:    CBC w/Diff  Lab Results   Component Value Date/Time    WBC 6.2 07/15/2021 09:38 AM    HGB 15.3 07/15/2021 09:38 AM    PLATELET 014 74/73/2408 09:38 AM         Basic Metabolic Profile  Lab Results   Component Value Date/Time    Sodium 139 04/07/2022 11:14 AM    Potassium 4.2 04/07/2022 11:14 AM    Chloride 104 04/07/2022 11:14 AM    CO2 28 04/07/2022 11:14 AM    Anion gap 7 04/07/2022 11:14 AM    Glucose 75 04/07/2022 11:14 AM    BUN 12 04/07/2022 11:14 AM    Creatinine 1.18 04/07/2022 11:14 AM    BUN/Creatinine ratio 10 (L) 04/07/2022 11:14 AM    GFR est AA >60 04/07/2022 11:14 AM    GFR est non-AA >60 04/07/2022 11:14 AM    Calcium 9.4 04/07/2022 11:14 AM        Cholesterol  Lab Results   Component Value Date/Time    Cholesterol, total 191 04/07/2022 11:14 AM    HDL Cholesterol 41 04/07/2022 11:14 AM    LDL, calculated 110.8 (H) 04/07/2022 11:14 AM    Triglyceride 196 (H) 04/07/2022 11:14 AM    CHOL/HDL Ratio 4.7 04/07/2022 11:14 AM           I have discussed the diagnosis with the patient and the intended plan as seen in the above orders. The patient has received an After-Visit Summary and questions were answered concerning future plans. An After Visit Summary was printed and given to the patient. All diagnosis have been discussed with the patient and all of the patient's questions have been answered. Follow-up and Dispositions    Return in about 3 months (around 3/5/2023) for CPE, 30 min, office only. Brijesh Theodore, LUDIVINAP-BC  [address] [place] [id number] [address] [facility] [address]  [id number]

## 2022-12-05 NOTE — PROGRESS NOTES
Room 7     When asked if patient has any concerns he would like to address with REINALDO Win patient states yes when I breathe my chest has been bothering me . Patient states I have been having shortness breathing and chest pain for the last 2-3 months and I have a sough that has been on-going for 6 months. Patient states I stopped smoking about 4 years ago . Did patient bring someone? No    Did the patient have DME equipment? No     Did you take your medication today? Yes       1. \"Have you been to the ER, urgent care clinic since your last visit? Hospitalized since your last visit? \" No    2. \"Have you seen or consulted any other health care providers outside of the [address] [place] [address] since your last visit? \" No     3. For patients aged 39-70: Has the patient had a colonoscopy / FIT/ Cologuard? Yes - no Care Gap present      If the patient is female:    4. For patients aged 41-77: Has the patient had a mammogram within the past 2 years? NA - based on age or sex      11. For patients aged 21-65: Has the patient had a pap smear?  NA - based on age or sex        3 most recent PHQ Screens 12/5/2022   PHQ Not Done -   Little interest or pleasure in doing things Not at all   Feeling down, depressed, irritable, or hopeless Not at all   Total Score PHQ 2 0         Health Maintenance Due   Topic Date Due    DTaP/Tdap/Td series (1 - Tdap) Never done    Shingrix Vaccine Age 50> (1 of 2) Never done    COVID-19 Vaccine (3 - Booster for Pfizer series) 11/26/2021    Flu Vaccine (1) Never done       Learning Assessment 8/5/2015   PRIMARY LEARNER Patient   HIGHEST LEVEL OF EDUCATION - PRIMARY LEARNER  4 YEARS OF COLLEGE   PRIMARY LANGUAGE ENGLISH   LEARNER PREFERENCE PRIMARY READING   ANSWERED BY patient   RELATIONSHIP SELF

## 2023-01-10 ENCOUNTER — TELEPHONE (OUTPATIENT)
Dept: FAMILY MEDICINE CLINIC | Age: 53
End: 2023-01-10

## 2023-01-11 NOTE — TELEPHONE ENCOUNTER
Called patient  States he continues to have constant chest pain. Denies sob or difficulty breathing. Continues to have a random sporadic cough. The cough is non productive  Denies wheezing  The pain has been ongoing for about 6 months, having relief for a few days at a time. The pain is not reproducible. Taking a deep breath does not exacerbate the pain. The pain is located approximately one hand width to the left of his sternum. He has appointment with pulmonary next month 2/9/23 at 1400. He is aware. Will await finding of pulmonary.   Patient verbalized understanding and is in agreement with this plan of care

## 2023-02-13 DIAGNOSIS — R07.89 CHEST WALL PAIN: Primary | ICD-10-CM

## 2023-02-17 PROBLEM — R07.9 CHEST PAIN: Status: ACTIVE | Noted: 2021-10-20

## 2023-02-17 RX ORDER — OXYCODONE HYDROCHLORIDE AND ACETAMINOPHEN 5; 325 MG/1; MG/1
1-2 TABLET ORAL EVERY 6 HOURS PRN
COMMUNITY
Start: 2022-09-21 | End: 2023-03-02

## 2023-02-17 RX ORDER — POLYETHYLENE GLYCOL 3350 17 G/17G
17 POWDER, FOR SOLUTION ORAL DAILY
COMMUNITY
Start: 2022-09-21 | End: 2023-03-02

## 2023-02-17 RX ORDER — TAMSULOSIN HYDROCHLORIDE 0.4 MG/1
0.4 CAPSULE ORAL
COMMUNITY
Start: 2019-08-27 | End: 2023-03-02

## 2023-02-17 RX ORDER — ONDANSETRON 4 MG/1
4 TABLET, ORALLY DISINTEGRATING ORAL EVERY 8 HOURS PRN
COMMUNITY
Start: 2022-09-21

## 2023-02-17 RX ORDER — IBUPROFEN 600 MG/1
600 TABLET ORAL EVERY 6 HOURS PRN
COMMUNITY
Start: 2019-08-27

## 2023-03-01 ENCOUNTER — PROCEDURE VISIT (OUTPATIENT)
Age: 53
End: 2023-03-01
Payer: COMMERCIAL

## 2023-03-01 VITALS — BODY MASS INDEX: 26.96 KG/M2 | HEIGHT: 69 IN | WEIGHT: 182 LBS | RESPIRATION RATE: 12 BRPM

## 2023-03-01 DIAGNOSIS — R05.3 CHRONIC COUGH: Primary | ICD-10-CM

## 2023-03-01 PROCEDURE — 94060 EVALUATION OF WHEEZING: CPT | Performed by: INTERNAL MEDICINE

## 2023-03-02 ENCOUNTER — OFFICE VISIT (OUTPATIENT)
Age: 53
End: 2023-03-02
Payer: COMMERCIAL

## 2023-03-02 VITALS — BODY MASS INDEX: 26.88 KG/M2 | HEIGHT: 69 IN

## 2023-03-02 DIAGNOSIS — R05.3 CHRONIC COUGH: ICD-10-CM

## 2023-03-02 DIAGNOSIS — K21.9 LARYNGOPHARYNGEAL REFLUX (LPR): ICD-10-CM

## 2023-03-02 DIAGNOSIS — R07.89 ATYPICAL CHEST PAIN: Primary | ICD-10-CM

## 2023-03-02 PROCEDURE — 99204 OFFICE O/P NEW MOD 45 MIN: CPT | Performed by: INTERNAL MEDICINE

## 2023-03-02 ASSESSMENT — PATIENT HEALTH QUESTIONNAIRE - PHQ9
SUM OF ALL RESPONSES TO PHQ9 QUESTIONS 1 & 2: 0
SUM OF ALL RESPONSES TO PHQ QUESTIONS 1-9: 0
SUM OF ALL RESPONSES TO PHQ QUESTIONS 1-9: 0
2. FEELING DOWN, DEPRESSED OR HOPELESS: 0
SUM OF ALL RESPONSES TO PHQ QUESTIONS 1-9: 0
1. LITTLE INTEREST OR PLEASURE IN DOING THINGS: 0
SUM OF ALL RESPONSES TO PHQ QUESTIONS 1-9: 0

## 2023-03-02 NOTE — PROGRESS NOTES
Caryle Salina presents today for   Chief Complaint   Patient presents with    New Patient     Referred by Dr. Ender Kamara for chronic cough    Results     Spirometry 3/1/2023       Is someone accompanying this pt? No    Is the patient using any DME equipment during OV? No    -DME Company N/A    Depression Screening:    PHQ-9 Questionaire 3/2/2023   Little interest or pleasure in doing things 0   Feeling down, depressed, or hopeless 0   PHQ-9 Total Score 0       Learning Needs Questionnaire:     Who is the primary learner? Patient    What is the preferred language for health care of the primary learner? ENGLISH    How does the primary learner prefer to learn new concepts? DEMONSTRATION    Answered By Patient    Relationship to Learner SELF          Fall Risk:     No flowsheet data found. Abuse Screening:     No flowsheet data found. Coordination of Care:    1. Have you been to the ER, urgent care clinic since your last visit? Hospitalized since your last visit? Yes. Cande Tee -[id number] colic & history of kidney stones    2. Have you seen or consulted any other health care providers outside of the [address] [place] [address] since your last visit? Include any pap smears or colon screening. No    Medication list has been update per patient. Per patient, did not receive influenza vaccine last season.

## 2023-03-02 NOTE — PROGRESS NOTES
[address] [place] [address]  [phone]    [facility]  Pulmonary, Critical Care, and Sleep Medicine    Pulmonary Office Initial Consultation  Name: Bertin Bains 47 y.o. male  MRN: [id number]  : 1970  Service Date: 23    Referring Provider: SHOBHA Hanson NP  No address on file  Chief Complaint:   Chief Complaint   Patient presents with    New Patient     Referred for chronic cough    Results     Spirometry 3/1/2023       History of Present Illness:  Bertin Bains is a 47 y.o. male, who presents to Pulmonary clinic referred for chronic cough by his PCP. Patient reports that his major complaint is ongoing chest pain in the central chest fort he last 6 months. Started constant and then went to intermittent. Pt reports pain is constant throughout the day, occurs in the morning and last throughout the day. Pt reports chest pain, dull, upper chest, 2-3 out of 10, no aggravating or alleviating alleviating factors  Pt took prevacid for a month, no change  Pt reports intermittent coughing and SOB. Pt reports SOB is intermittent, when he was hanging curtains  Pt reports was running up until 2 years ago. Pt reports very sedentary since that time. Pt reports cough is very infrequent --- stopped about 3 months ago, calmed down the last 2 months. PCP prescribed as needed albuterol, he reports no changes with PRN albuterol.   No nocturnal awakenings  No ER or urgent care visits  No allergic triggers, such as pollen, perfumes, chemicals, dust, etc.    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      Past Medical History:   Diagnosis Date    ADHD (attention deficit hyperactivity disorder)     Bipolar 2 disorder ([facility] [address]     Encounter for vasectomy     Hydronephrosis     Kidney stones     Stroke [facility])     Ureterolithiasis      Past Surgical History:   Procedure Laterality Date    COLONOSCOPY      2013    VASECTOMY Bilateral 03/15/2018    Dr.Brugh ZEINAB     Family History   Problem Relation Age of Onset    Hypertension Mother     Stroke Father     Cancer Father         thyroid    Heart Failure Father     Cancer Paternal Grandfather         colon     Social History     Socioeconomic History    Marital status:      Spouse name: Not on file    Number of children: Not on file    Years of education: Not on file    Highest education level: Not on file   Occupational History    Not on file   Tobacco Use    Smoking status: Former     Packs/day: 1.00     Years: 10.00     Pack years: 10.00     Types: Cigarettes     Start date: 1993     Quit date: 2005     Years since quittin.1    Smokeless tobacco: Never    Tobacco comments:     On & off   Vaping Use    Vaping Use: Never used   Substance and Sexual Activity    Alcohol use: No    Drug use: No    Sexual activity: Not on file   Other Topics Concern    Not on file   Social History Narrative    Not on file     Social Determinants of Health     Financial Resource Strain: Not on file   Food Insecurity: Not on file   Transportation Needs: Not on file   Physical Activity: Not on file   Stress: Not on file   Social Connections: Not on file   Intimate Partner Violence: Not on file   Housing Stability: Not on file     Patient has no known allergies.    Prior to Admission medications    Medication Sig Start Date End Date Taking? Authorizing Provider   ibuprofen (ADVIL;MOTRIN) 600 MG tablet Take 600 mg by mouth every 6 hours as needed 19  Yes Historical Provider, MD   ondansetron (ZOFRAN-ODT) 4 MG disintegrating tablet Take 4 mg by mouth every 8 hours as needed for Nausea or Vomiting 22  Yes Historical Provider, MD   albuterol sulfate HFA (PROVENTIL;VENTOLIN;PROAIR) 108 (90 Base) MCG/ACT inhaler Inhale 2 puffs into the lungs every 4 hours as needed for Wheezing or Shortness of Breath 22  Yes  Ar Automatic Reconciliation   lamoTRIgine (LAMICTAL) 200 MG tablet Take 200 mg by mouth 4 times daily   Yes Ar Automatic Reconciliation   lurasidone (LATUDA) 80 MG TABS tablet Take 80 mg by mouth daily (with breakfast) 7/8/21  Yes Ar Automatic Reconciliation   methylphenidate (RITALIN) 20 MG tablet take 1 tablet by mouth three times a day if needed 2/1/18  Yes Ar Automatic Reconciliation   oxyCODONE-acetaminophen (PERCOCET) 5-325 MG per tablet Take 1-2 tablets by mouth every 6 hours as needed. Patient not taking: Reported on 3/2/2023 9/21/22 3/2/23  Historical Provider, MD   polyethylene glycol (GLYCOLAX) 17 GM/SCOOP powder Take 17 g by mouth daily  Patient not taking: Reported on 3/2/2023 9/21/22 3/2/23  Historical Provider, MD   tamsulosin (FLOMAX) 0.4 MG capsule Take 0.4 mg by mouth  Patient not taking: Reported on 3/2/2023 8/27/19 3/2/23  Historical Provider, MD     Immunization History   Administered Date(s) Administered    COVID-19, PFIZER PURPLE top, DILUTE for use, (age 15 y+), 30mcg/0.3mL 09/10/2021, 10/01/2021       Review of Systems:  A complete review of systems was performed as stated in the HPI, all others are negative. Objective:    Physical Exam:  Ht 5' 9\" (1.753 m)   BMI 26.88 kg/m² vitals were unable to be uploaded into [employer] care, blood pressure, SPO2, heart rate are all within normal limits  Vitals were personally reviewed  Gen: no acute distress, pleasant and cooperative, sitting up in chair, ambulates without difficulty  HEENT: normocephalic/atraumatic, no ocular drainage, EOMI, no scleral icterus, nasal bridge midline, no nasal drainage, good dentition, no oral lesions  Neck: supple, trachea midline, no JVD  CVS: regular rate rhythm, S1/S2, no murmurs/rubs/gallops  Lungs: good air entry B/L, CTABL, no wheezes/rales/rhonchi  Psych: normal memory, thought content, and processing    Labs:   I have reviewed the patient's available labs  Lab Results   Component Value Date/Time    WBC 6.2 07/15/2021 09:38 AM    HGB 15.3 07/15/2021 09:38 AM    HCT 47.8 07/15/2021 09:38 AM     07/15/2021 09:38 AM    MCV 95.0 07/15/2021 09:38 AM     Lab Results   Component Value Date/Time     04/07/2022 11:14 AM    K 4.2 04/07/2022 11:14 AM     04/07/2022 11:14 AM    CO2 28 04/07/2022 11:14 AM    BUN 12 04/07/2022 11:14 AM    GFRAA >60 04/07/2022 11:14 AM    GLOB 3.0 04/07/2022 11:14 AM    ALT 20 04/07/2022 11:14 AM    AST 16 04/07/2022 11:14 AM       Outside records reviewed in clinic as follows:  -Last progress note by PCP, NP Junnie Dubin from 12/5/2022, reports the patient has a chronic cough, chest x-ray PA and lateral ordered, referred to pulmonary medicine, prescribed Proventil, signs and symptoms consistent with possible asthma, ordered inhaler. Patient also has chest wall pain, referred to cardiology, high sensitive troponin, twelve-lead EKG reviewed reporting no issues. Imaging:  I have personally reviewed patient's imaging as follows: Chest x-ray PA and lateral from 12/5/2022 shows clear lung fields bilaterally throughout without infiltrate, consolidation, mass, effusion. Official report per radiology:  Xray Result (most recent):  XR CHEST STANDARD TWO VW 12/05/2022    Narrative  HISTORY: Cough for 6 months. EXAM: Chest.    TECHNIQUE: Two views of the chest were obtained. COMPARISON: 8/29/2016. FINDINGS: There is no pneumothorax, pneumonia or pleural effusions. Heart and  mediastinal structures are unremarkable. . Visualized bony thorax and soft  tissues are within normal limits. Impression  IMPRESSION:    1. No acute cardiopulmonary process. No change. PFTs:  I have reviewed the patient's PFTs from yesterday as follows: Spirometry is normal without BD response. TTE:  I have reviewed the patient's TTE results: None on file      Assessment and Plan:  47 y.o. male with:    Impression:  1.   Atypical chest pain: Etiology unclear, given negative chest x-ray and PFTs, and no allergic triggers, I suspect no pulmonary etiology including asthma at this time. Patient also reports no improvement with as needed albuterol. Chest pain may be cardiac in nature, however does not occur with exertion, present throughout the day making this less likely. GERD possible, however patient did use empiric PPI for 1 month with no improvement. Costochondritis is possible. 2.  Chronic cough: Improving. Etiology unclear, now resolved, may be secondary to UACS versus LPR, etc.  3.  Suspect LPR  4. History of tobacco use: Quit smoking in 2004 - prior 12 year smoker, then socially smoked until 2019. No evidence of COPD on PFTs, also negative chest x-ray making COPD unlikely    Plan:  -Reviewed findings and imaging as noted above, patient also has negative lower lung fields on CT abdomen imaging from 5/24/2022. Advised patient no pulmonary etiology of atypical chest pain or chronic cough identified. Offered CT chest, although low yield. Patient declined at this time given mild symptoms and improvement in cough  -Empiric trial of high-dose PPI with lansoprazole 30 mg p.o. daily (before breakfast). Counseled patient on proper timing. Advised patient to contact our office if he needs new prescription, currently has some on hand  -Continue albuterol PRN. Advised pt to notify our office if he has frequent use or improvement to symptoms with further use  -Given ongoing chest pain, advised to follow-up with cardiology for further testing, I will avoid ordering any cardiac testing such as stress testing at this time given pending consultation.  -Advised patient to remain active and engage in graded exercise    Return in about 1 year (around 3/2/2024).       Radha Griffin MD/MPH     Pulmonary, Critical Care Medicine  [facility]

## 2023-03-07 ENCOUNTER — HOSPITAL ENCOUNTER (OUTPATIENT)
Facility: HOSPITAL | Age: 53
Setting detail: SPECIMEN
Discharge: HOME | End: 2023-03-10
Payer: COMMERCIAL

## 2023-03-07 ENCOUNTER — OFFICE VISIT (OUTPATIENT)
Facility: CLINIC | Age: 53
End: 2023-03-07
Payer: COMMERCIAL

## 2023-03-07 VITALS
HEART RATE: 98 BPM | TEMPERATURE: 98.2 F | DIASTOLIC BLOOD PRESSURE: 88 MMHG | HEIGHT: 69 IN | RESPIRATION RATE: 18 BRPM | WEIGHT: 181.6 LBS | BODY MASS INDEX: 26.9 KG/M2 | OXYGEN SATURATION: 98 % | SYSTOLIC BLOOD PRESSURE: 128 MMHG

## 2023-03-07 DIAGNOSIS — Z00.00 ANNUAL PHYSICAL EXAM: Primary | ICD-10-CM

## 2023-03-07 DIAGNOSIS — G25.2 RESTING TREMOR: ICD-10-CM

## 2023-03-07 DIAGNOSIS — Z00.00 ANNUAL PHYSICAL EXAM: ICD-10-CM

## 2023-03-07 PROBLEM — R07.9 CHEST PAIN: Status: RESOLVED | Noted: 2021-10-20 | Resolved: 2023-03-07

## 2023-03-07 PROBLEM — F31.81 BIPOLAR 2 DISORDER (HCC): Status: ACTIVE | Noted: 2022-04-07

## 2023-03-07 LAB
ALBUMIN SERPL-MCNC: 4.3 G/DL (ref 3.4–5)
ALBUMIN/GLOB SERPL: 1.4 (ref 0.8–1.7)
ALP SERPL-CCNC: 89 U/L (ref 45–117)
ALT SERPL-CCNC: 30 U/L (ref 16–61)
ANION GAP SERPL CALC-SCNC: 5 MMOL/L (ref 3–18)
AST SERPL-CCNC: 20 U/L (ref 10–38)
BILIRUB SERPL-MCNC: 0.5 MG/DL (ref 0.2–1)
BUN SERPL-MCNC: 12 MG/DL (ref 7–18)
BUN/CREAT SERPL: 9 (ref 12–20)
CALCIUM SERPL-MCNC: 9.5 MG/DL (ref 8.5–10.1)
CHLORIDE SERPL-SCNC: 102 MMOL/L (ref 100–111)
CO2 SERPL-SCNC: 28 MMOL/L (ref 21–32)
CREAT SERPL-MCNC: 1.29 MG/DL (ref 0.6–1.3)
GLOBULIN SER CALC-MCNC: 3 G/DL (ref 2–4)
GLUCOSE SERPL-MCNC: 102 MG/DL (ref 74–99)
POTASSIUM SERPL-SCNC: 4.1 MMOL/L (ref 3.5–5.5)
PROT SERPL-MCNC: 7.3 G/DL (ref 6.4–8.2)
SODIUM SERPL-SCNC: 135 MMOL/L (ref 136–145)
T4 FREE SERPL-MCNC: 1 NG/DL (ref 0.7–1.5)
TSH SERPL DL<=0.05 MIU/L-ACNC: 1.6 UIU/ML (ref 0.36–3.74)

## 2023-03-07 PROCEDURE — 80053 COMPREHEN METABOLIC PANEL: CPT

## 2023-03-07 PROCEDURE — 84443 ASSAY THYROID STIM HORMONE: CPT

## 2023-03-07 PROCEDURE — 99396 PREV VISIT EST AGE 40-64: CPT | Performed by: NURSE PRACTITIONER

## 2023-03-07 PROCEDURE — 84439 ASSAY OF FREE THYROXINE: CPT

## 2023-03-07 PROCEDURE — 36415 COLL VENOUS BLD VENIPUNCTURE: CPT

## 2023-03-07 SDOH — ECONOMIC STABILITY: TRANSPORTATION INSECURITY
IN THE PAST 12 MONTHS, HAS LACK OF TRANSPORTATION KEPT YOU FROM MEETINGS, WORK, OR FROM GETTING THINGS NEEDED FOR DAILY LIVING?: NO

## 2023-03-07 SDOH — ECONOMIC STABILITY: TRANSPORTATION INSECURITY
IN THE PAST 12 MONTHS, HAS THE LACK OF TRANSPORTATION KEPT YOU FROM MEDICAL APPOINTMENTS OR FROM GETTING MEDICATIONS?: NO

## 2023-03-07 SDOH — ECONOMIC STABILITY: HOUSING INSECURITY
IN THE LAST 12 MONTHS, WAS THERE A TIME WHEN YOU DID NOT HAVE A STEADY PLACE TO SLEEP OR SLEPT IN A SHELTER (INCLUDING NOW)?: NO

## 2023-03-07 SDOH — ECONOMIC STABILITY: INCOME INSECURITY: IN THE LAST 12 MONTHS, WAS THERE A TIME WHEN YOU WERE NOT ABLE TO PAY THE MORTGAGE OR RENT ON TIME?: NO

## 2023-03-07 SDOH — ECONOMIC STABILITY: FOOD INSECURITY: WITHIN THE PAST 12 MONTHS, THE FOOD YOU BOUGHT JUST DIDN'T LAST AND YOU DIDN'T HAVE MONEY TO GET MORE.: NEVER TRUE

## 2023-03-07 SDOH — ECONOMIC STABILITY: FOOD INSECURITY: WITHIN THE PAST 12 MONTHS, YOU WORRIED THAT YOUR FOOD WOULD RUN OUT BEFORE YOU GOT MONEY TO BUY MORE.: NEVER TRUE

## 2023-03-07 ASSESSMENT — ENCOUNTER SYMPTOMS
GASTROINTESTINAL NEGATIVE: 1
RESPIRATORY NEGATIVE: 1
EYES NEGATIVE: 1

## 2023-03-07 ASSESSMENT — SOCIAL DETERMINANTS OF HEALTH (SDOH): HOW HARD IS IT FOR YOU TO PAY FOR THE VERY BASICS LIKE FOOD, HOUSING, MEDICAL CARE, AND HEATING?: NOT HARD AT ALL

## 2023-03-07 ASSESSMENT — PATIENT HEALTH QUESTIONNAIRE - PHQ9
SUM OF ALL RESPONSES TO PHQ QUESTIONS 1-9: 0
SUM OF ALL RESPONSES TO PHQ QUESTIONS 1-9: 0
1. LITTLE INTEREST OR PLEASURE IN DOING THINGS: 0
SUM OF ALL RESPONSES TO PHQ QUESTIONS 1-9: 0
SUM OF ALL RESPONSES TO PHQ QUESTIONS 1-9: 0
SUM OF ALL RESPONSES TO PHQ9 QUESTIONS 1 & 2: 0
2. FEELING DOWN, DEPRESSED OR HOPELESS: 0

## 2023-03-07 NOTE — PROGRESS NOTES
[address] [place], [place] [address] [address]               [phone]      Garett Citizen is a 47 y.o. male and presents with Follow-up       Assessment/Plan:    1. Annual physical exam  -     Comprehensive Metabolic Panel; Future  Completed today, abnormalities as documented  2. Resting tremor  -     Comprehensive Metabolic Panel; Future  -     TSH; Future  -     T4, Free; Future  -     Maricruz Pedroza MD, Neurology, [facility]   New onset r hand resting tremor, see ROS for more information  Obtain labs for reversible causes  Refer to neuro for further evaluation  Patient verbalized understanding and is in agreement with this plan of care    Follow up and disposition:   Return in about 1 year (around 3/7/2024) for CPE, 30min, office. Health Maintenance:   Health Maintenance   Topic Date Due    HIV screen  Never done    DTaP/Tdap/Td vaccine (1 - Tdap) Never done    Shingles vaccine (1 of 2) Never done    COVID-19 Vaccine (3 - Booster for Pfizer series) 11/26/2021    Flu vaccine (1) Never done    Depression Monitoring  03/02/2024    Lipids  04/07/2027    Colorectal Cancer Screen  06/27/2032    Hepatitis C screen  Completed    Hepatitis A vaccine  Aged Out    Hib vaccine  Aged Out    Meningococcal (ACWY) vaccine  Aged Out    Pneumococcal 0-64 years Vaccine  Aged Out        Subjective:    Labs obtained prior to visit? No  Reviewed with patient? N/A    Last visit 12/5/22 referred to pulmonary and cardiology for chronic cough and chest pain respectively  States he saw pulmonsry, was suggest to take PPI prevacid in am instead of PM  Has been taking prevacid in AM and states the chest pain is a little bit better  Continue to monitor    ROS:     Review of Systems   Constitutional: Negative. HENT: Negative. Eyes: Negative. Respiratory: Negative. Cardiovascular: Negative. Gastrointestinal: Negative. Genitourinary: Negative. Musculoskeletal: Negative. Skin: Negative. Neurological:  Positive for tremors. Tremors: Onset: mid 2023  Location: right hand only  Symptoms: no tremors when writing or typing, worse when hand is at rest  The problem is isolated to his hand  Has not had any workup for this problem as of yet  Denies any weakness  Current medications include ritalin 20mg TID, has been on this for about 20 years, latuda for 10 years, lamictal for about 12 years  Has not seen psychiatry since the tremors started  Has not family history of parkinsons  Nothing makes the tremor worse  Using the hand makes the tremor better  Denies any tremor elswhere: legs, opposite hand, head  Cannot voluntarily make the tremor stop  No loss of dexterity, no problems walking, no change in voice  The onset was slow, states his daughter was the firs tto notice in 2023  Denies trauma to arm or neck   Psychiatric/Behavioral: Negative. The problem list was updated as a part of today's visit. Patient Active Problem List   Diagnosis    ADHD (attention deficit hyperactivity disorder)    TIA (transient ischemic attack)    Bipolar 2 disorder (HCC)       The PS, FH were reviewed.       SH:  Social History     Tobacco Use    Smoking status: Former     Packs/day: 1.00     Years: 10.00     Pack years: 10.00     Types: Cigarettes     Start date: 1993     Quit date: 2005     Years since quittin.1    Smokeless tobacco: Never    Tobacco comments:     On & off   Vaping Use    Vaping Use: Never used   Substance Use Topics    Alcohol use: No    Drug use: No       Medications/Allergies:  Current Outpatient Medications on File Prior to Visit   Medication Sig Dispense Refill    ibuprofen (ADVIL;MOTRIN) 600 MG tablet Take 600 mg by mouth every 6 hours as needed      lamoTRIgine (LAMICTAL) 200 MG tablet Take 200 mg by mouth 4 times daily      lurasidone (LATUDA) 80 MG TABS tablet Take 80 mg by mouth daily (with breakfast)      methylphenidate (RITALIN) 20 MG tablet take 1 tablet by mouth three times a day if needed       No current facility-administered medications on file prior to visit. No Known Allergies    Objective:  /88   Pulse 98   Temp 98.2 °F (36.8 °C) (Temporal)   Resp 18   Ht 5' 9\" (1.753 m)   Wt 181 lb 9.6 oz (82.4 kg)   SpO2 98%   BMI 26.82 kg/m²  Body mass index is 26.82 kg/m². Physical assessment  Physical Exam  Vitals and nursing note reviewed. Constitutional:       Appearance: Normal appearance. HENT:      Head: Normocephalic and atraumatic. Right Ear: Tympanic membrane, ear canal and external ear normal.      Left Ear: Tympanic membrane, ear canal and external ear normal.      Mouth/Throat:      Mouth: Mucous membranes are moist.   Eyes:      Conjunctiva/sclera: Conjunctivae normal.      Pupils: Pupils are equal, round, and reactive to light. Cardiovascular:      Rate and Rhythm: Normal rate and regular rhythm. Heart sounds: Normal heart sounds. Pulmonary:      Effort: Pulmonary effort is normal.      Breath sounds: Normal breath sounds. Abdominal:      General: Bowel sounds are normal.      Palpations: Abdomen is soft. Tenderness: There is no abdominal tenderness. Musculoskeletal:         General: Normal range of motion. Cervical back: Normal range of motion. Skin:     General: Skin is warm and dry. Neurological:      General: No focal deficit present. Mental Status: He is alert and oriented to person, place, and time. Cranial Nerves: No facial asymmetry. Sensory: Sensation is intact. Motor: Tremor present. No weakness. Coordination: Romberg sign negative. Coordination normal. Finger-Nose-Finger Test normal. Rapid alternating movements normal.      Gait: Gait is intact. Comments: Right hand tremor   Psychiatric:         Mood and Affect: Mood normal.         Behavior: Behavior normal.         Thought Content:  Thought content normal.         Judgment: Judgment normal. I have discussed the diagnosis with the patient and the intended plan as seen in the above orders. The patient has received an After-Visit Summary and questions were answered concerning future plans. An After Visit Summary was printed and given to the patient. All diagnosis have been discussed with the patient and all of the patient's questions have been answered. Latoya Johns, AGNP-BC  [address]  [id number]

## 2023-03-07 NOTE — PROGRESS NOTES
Room 8. When asked if patient has any concerns he would like to address with ROBYN Ann patient states yes my right hand keeps shaking this started around January . Did patient bring someone? No     Did the patient have DME equipment? No     Did you take your medication today? Yes       1. \"Have you been to the ER, urgent care clinic since your last visit? Hospitalized since your last visit? \" No     2. \"Have you seen or consulted any other health care providers outside of the [address] [place] [address] since your last visit? \" No     3. For patients aged 39-70: Has the patient had a colonoscopy / FIT/ Cologuard? Yes      If the patient is female:    4. For patients aged 41-77: Has the patient had a mammogram within the past 2 years? N/A      5. For patients aged 21-65: Has the patient had a pap smear? {Cancer Care Gap present? N/A      PHQ-9  3/7/2023   Little interest or pleasure in doing things 0   Little interest or pleasure in doing things -   Feeling down, depressed, or hopeless 0   PHQ-2 Score 0   Total Score PHQ 2 -   PHQ-9 Total Score 0          Health Maintenance Due   Topic Date Due    HIV screen  Never done    DTaP/Tdap/Td vaccine (1 - Tdap) Never done    Shingles vaccine (1 of 2) Never done    COVID-19 Vaccine (3 - Booster for Pfizer series) 11/26/2021    Flu vaccine (1) Never done         Who is the primary learner? Patient    What is the preferred language for health care of the primary learner? ENGLISH    How does the primary learner prefer to learn new concepts?  DEMONSTRATION    Answered By Patient    Relationship to Learner SELF

## 2023-07-27 ENCOUNTER — OFFICE VISIT (OUTPATIENT)
Age: 53
End: 2023-07-27
Payer: COMMERCIAL

## 2023-07-27 VITALS
TEMPERATURE: 96.8 F | OXYGEN SATURATION: 97 % | HEIGHT: 69 IN | DIASTOLIC BLOOD PRESSURE: 86 MMHG | RESPIRATION RATE: 20 BRPM | HEART RATE: 69 BPM | WEIGHT: 179.8 LBS | SYSTOLIC BLOOD PRESSURE: 116 MMHG | BODY MASS INDEX: 26.63 KG/M2

## 2023-07-27 DIAGNOSIS — R25.1 TREMOR OF RIGHT HAND: Primary | ICD-10-CM

## 2023-07-27 PROCEDURE — 99203 OFFICE O/P NEW LOW 30 MIN: CPT | Performed by: STUDENT IN AN ORGANIZED HEALTH CARE EDUCATION/TRAINING PROGRAM

## 2023-07-27 ASSESSMENT — ENCOUNTER SYMPTOMS
NAUSEA: 0
BACK PAIN: 0
COUGH: 0
SHORTNESS OF BREATH: 0
VOMITING: 0
CONSTIPATION: 0

## 2023-07-27 NOTE — PROGRESS NOTES
Shelia Levi is a 47 y.o. male . presents for New Patient and Tremors  A 47years old male patient with medical history of bipolar disorder currently on Lamictal 200 mg 4 times a day [with the [place] for about 7 or 8 years and discontinued 4 months ago] referred here for evaluation of right upper extremity tremor. Tremor started in January. About 5 months ago, patient started exercising. He claims tremor is better after he started exercising. It is mostly resting. No involvement of the left hand, lower extremities, head and neck. No jaw tremor. Normal tongue movements. Denied having any stiffness over his extremities. Denied any problem using his right hand: No problem with fine dexterity. No problem writing. No problem holding utensils. No problem walking. Does not shuffle his gait. No balance difficulty and no falls. Denied slowness when he is walking and in her routine daily activities. No significant changes in his speech. No drooling from his mouth and no difficulty swallowing. His sleep is good. He denied night terrors or bad dreams. No hallucinations. No problem with the sense of smell. No RLS. Has a diagnosis of bipolar disorder. After Latuda stopped about 4 months ago, did not see any significant changes. No previous other neuroleptics. No family history of tremors or Parkinson's disease. No past history of alcohol or drug abuse. He currently works as as an ; no problem at work. Tremor    Review of Systems   Constitutional:  Negative for chills, diaphoresis, fatigue, fever and unexpected weight change. HENT:  Negative for hearing loss and tinnitus. No problem smelling   Eyes:  Negative for visual disturbance. Respiratory:  Negative for cough and shortness of breath. Cardiovascular:  Negative for chest pain and leg swelling. Gastrointestinal:  Negative for constipation, nausea and vomiting. Genitourinary:  Negative for dysuria, frequency and urgency.

## 2023-08-07 ENCOUNTER — PATIENT MESSAGE (OUTPATIENT)
Facility: CLINIC | Age: 53
End: 2023-08-07

## 2023-08-10 ENCOUNTER — OFFICE VISIT (OUTPATIENT)
Age: 53
End: 2023-08-10
Payer: COMMERCIAL

## 2023-08-10 VITALS
BODY MASS INDEX: 26.88 KG/M2 | OXYGEN SATURATION: 97 % | WEIGHT: 182 LBS | HEART RATE: 84 BPM | DIASTOLIC BLOOD PRESSURE: 81 MMHG | SYSTOLIC BLOOD PRESSURE: 122 MMHG

## 2023-08-10 DIAGNOSIS — R07.9 CHEST PAIN, UNSPECIFIED TYPE: Primary | ICD-10-CM

## 2023-08-10 PROCEDURE — 99204 OFFICE O/P NEW MOD 45 MIN: CPT | Performed by: INTERNAL MEDICINE

## 2023-08-10 RX ORDER — OMEPRAZOLE 20 MG/1
CAPSULE, DELAYED RELEASE ORAL
COMMUNITY
Start: 2023-07-13

## 2023-08-10 NOTE — PROGRESS NOTES
Identified pt with two pt identifiers(name and ). Reviewed record in preparation for visit and have obtained necessary documentation. Verner Norlander presents today for   Chief Complaint   Patient presents with    New Patient       Pt denied DIZZINESS, SOB, CHEST PAIN/ PRESSURE, FATIGUE/WEAKNESS, HEADACHES, SWELLING. Verner Norlander preferred language for health care discussion is english/other. Personal Protective Equipment:   Personal Protective Equipment was used including: mask-surgical and hands-gloves. Patient was placed on no precaution(s). Patient was not masked. Precautions:   Patient currently on None  Patient currently roomed with door closed. Is someone accompanying this pt? no    Is the patient using any DME equipment during OV? no    Depression Screening:  PHQ-9 Questionaire 3/7/2023 3/2/2023 2022 2022   Little interest or pleasure in doing things 0 0 0 0   Feeling down, depressed, or hopeless 0 0 0 0   PHQ-9 Total Score 0 0 0 0        Learning Assessment:  No question data found. Abuse Screening:  Completed      Fall Risk  Completed      Pt currently taking Anticoagulant therapy? no  Pt currently taking Antiplatelet therapy? no    Coordination of Care:  1. Have you been to the ER, urgent care clinic since your last visit? Hospitalized since your last visit? [facility]- [facility]    2. Have you seen or consulted any other health care providers outside of the [address] [place] [address] since your last visit? Include any pap smears or colon screening. no      Please see Red banners under Allergies and Med Rec to remove outside inquires. All correct information has been verified with patient and added to chart.      Medication's patient's would liked removed has been marked not taking to be removed per Verbal order and read back per Gianna Salgado MD

## 2023-08-10 NOTE — PROGRESS NOTES
[facility]    Domi Grossman is 47 y.o. male     Patient is here today for cardiac evaluation  He denies prior history of MI or CHF  Patient was having some chest discomfort several months ago so he was seen by pulmonary team thinking that he may have had asthma. He was told that he may have reflux symptoms so he started taking PPI and he feels like his symptoms have improved significantly. He does not have any chest pain or chest tightness when he is exercising. He runs on a treadmill for about 5 or 6 mile 3 times a week which takes him about 1 hour. When he is running on the treadmill he does not appear to be having any chest pain or chest tightness. His random chest episode has improved overall. He has noticed that his heart rate appropriately rises from resting heart rate when he is running however once his heart rate drops from 150 down to 110 it stays there for a long duration of the time after the run. He does not have any presyncope or syncope. No orthopnea or PND. Denies any nausea, vomiting, abdominal pain, fever, chills, sputum production. No hematuria or other bleeding complaints    Past Medical History:   Diagnosis Date    ADHD (attention deficit hyperactivity disorder)     Bipolar 2 disorder ([address] )     Encounter for vasectomy     Hydronephrosis     Kidney stones     TIA (transient ischemic attack)     Ureterolithiasis        Review of Systems:  Cardiac symptoms as noted above in HPI. All others negative. Denies fatigue, malaise, skin rash, joint pain, blurring vision, photophobia, neck pain, hemoptysis, chronic cough, nausea, vomiting, hematuria, burning micturition, BRBPR, chronic headaches.     Current Outpatient Medications   Medication Sig    omeprazole (PRILOSEC) 20 MG delayed release capsule take 1 capsule by mouth 1 hour before breakfast    lamoTRIgine (LAMICTAL) 200 MG tablet Take 1 tablet by mouth 4 times daily

## 2023-08-10 NOTE — PATIENT INSTRUCTIONS
"  [facility]  SPORTS & ORTHOPEDIC CLINIC VISIT     Mar 18, 2021        ASSESSMENT & PLAN      Carpal tunnel syndrome of right wrist  -     Orthopedic & Spine  Referral        Reviewed imaging and assessment with patient in detail  Steroid injection. See procedure note for details.     Freddy Ortega MD  [facility] ORTHOPEDIC [facility]    -----  Chief Complaint   Patient presents with     Right Wrist - Pain       SUBJECTIVE  Jesus Hurd is a/an 39 year old adult who is seen in consultation at the request of Dr.Nicholas Moni MD for evaluation of  Carpal Tunnel Syndrome of R Wrist.     The patient is seen by themselves.  The patient is Right handed    Onset: 6 month(s) ago. Patient describes injury as gradually getting worse as the time goes on. Has tried night time splinting, CSI, Hand therapy, and compression in hopes to avoid surgery. Last CSI 12/11/20  Location of Pain: right wrist  Worsened by: ROM, lifting, gripping   Better with: Rest   Treatments tried: hand therapy (1 visits), corticosteroid injection (most recent date: 12/11/20) that provided  2.5 month(s) of relief and casting/splinting/bracing  Associated symptoms: numbness, tingling and weakness    Orthopedic/Surgical history: NO  Social History/Occupation:  and        REVIEW OF SYSTEMS:    Do you have fever, chills, weight loss? No    Do you have any vision problems? No    Do you have any chest pain or edema? No    Do you have any shortness of breath or wheezing?  No    Do you have stomach problems? No    Do you have any numbness or focal weakness? No    Do you have diabetes? No    Do you have problems with bleeding or clotting? No    Do you have an rashes or other skin lesions? No    OBJECTIVE:  Ht 1.676 m (5' 6\")   Wt 94.3 kg (208 lb)   BMI 33.57 kg/m       General: Alert, pleasant, no distress  Radiographs: Tinel positive Phalen positive reports decreased sensation of the " Testing   Echo  Nuclear Stress  Nuclear Stress Instructions-Nothing to eat or drink past midnight and no medicaitons the morning of cardiac testing. **call office 3-5 days after testing is completed for results** Please ensure testing is completed prior to scheduled follow up appointment. If it is not completed your appointment may be rescheduled**      Other Testing  [employer]-will be calling you to confirm your address to send your Heart Monitor. Please allow 7-10 business days for monitor to arrive at your home.   Customer Service for [address] [place] [address] Avenue:  [phone] middle and index finger.  Strength intact.    RADIOLOGY:

## 2023-08-14 NOTE — TELEPHONE ENCOUNTER
From: Hines Severin  To: Alison Gamble  Sent: 8/7/2023 8:21 AM EDT  Subject: MRI    I am very claustrophobic and have a MRI on the 31st. Is there anyway you can call in a prescription for something I could take that day before procedure?

## 2023-08-31 ENCOUNTER — HOSPITAL ENCOUNTER (OUTPATIENT)
Facility: HOSPITAL | Age: 53
Discharge: HOME | End: 2023-08-31
Attending: STUDENT IN AN ORGANIZED HEALTH CARE EDUCATION/TRAINING PROGRAM
Payer: COMMERCIAL

## 2023-08-31 DIAGNOSIS — R25.1 TREMOR OF RIGHT HAND: ICD-10-CM

## 2023-08-31 PROCEDURE — 70551 MRI BRAIN STEM W/O DYE: CPT

## 2023-09-07 ENCOUNTER — TELEPHONE (OUTPATIENT)
Age: 53
End: 2023-09-07

## 2023-09-07 DIAGNOSIS — R07.9 CHEST PAIN, UNSPECIFIED TYPE: Primary | ICD-10-CM

## 2023-10-02 ENCOUNTER — TELEPHONE (OUTPATIENT)
Age: 53
End: 2023-10-02

## 2023-10-02 NOTE — TELEPHONE ENCOUNTER
----- Message from Chadd Sheppard MD sent at 9/25/2023  1:10 PM EDT -----  Please inform patient regarding test finding  Appears normal.    Thanks  SP

## 2023-11-02 ENCOUNTER — OFFICE VISIT (OUTPATIENT)
Age: 53
End: 2023-11-02
Payer: COMMERCIAL

## 2023-11-02 VITALS
BODY MASS INDEX: 27.73 KG/M2 | OXYGEN SATURATION: 98 % | TEMPERATURE: 96.4 F | RESPIRATION RATE: 20 BRPM | HEART RATE: 85 BPM | DIASTOLIC BLOOD PRESSURE: 84 MMHG | HEIGHT: 69 IN | SYSTOLIC BLOOD PRESSURE: 116 MMHG | WEIGHT: 187.2 LBS

## 2023-11-02 DIAGNOSIS — R25.1 TREMOR OF RIGHT HAND: Primary | ICD-10-CM

## 2023-11-02 PROCEDURE — 99213 OFFICE O/P EST LOW 20 MIN: CPT | Performed by: STUDENT IN AN ORGANIZED HEALTH CARE EDUCATION/TRAINING PROGRAM

## 2023-11-02 ASSESSMENT — ENCOUNTER SYMPTOMS
VOMITING: 0
COUGH: 0
CONSTIPATION: 0
BACK PAIN: 0
NAUSEA: 0
SHORTNESS OF BREATH: 0

## 2023-11-02 NOTE — PROGRESS NOTES
min:sec Final    Stress Stage 2 HR 09/26/2023 107  bpm Final    Stress Stage 2 BP 09/26/2023 142/80  mmHg Final    Stress Stage 3 Duration 09/26/2023 3  min:sec Final    Stress Stage 3 HR 09/26/2023 137  bpm Final    Stress Stage 3 BP 09/26/2023 160/80  mmHg Final    Stress Stage 4 Duration 09/26/2023 1:21  min:sec Final    Stress Stage 4 HR 09/26/2023 168  bpm Final    Recovery Stage 1 Duration 09/26/2023 2  min:sec Final    Recovery Stage 1 HR 09/26/2023 127  bpm Final    Recovery Stage 1 BP 09/26/2023 124/78  mmHg Final    Recovery Stage 2 Duration 09/26/2023 2  min:sec Final    Recovery Stage 2 HR 09/26/2023 112  bpm Final    Recovery Stage 2 BP 09/26/2023 128/82  mmHg Final    Body Surface Area 09/26/2023 2  m2 Final    TID 09/26/2023 0.70   Final    Nuc Stress EF 09/26/2023 68  % Final   Ancillary Procedure on 09/25/2023   Component Date Value Ref Range Status    Body Surface Area 09/25/2023 2  m2 Final    IVSd 09/25/2023 1.1 (A)  0.6 - 1.0 cm Final    LVIDd 09/25/2023 4.4  4.2 - 5.9 cm Final    LVIDs 09/25/2023 3.2  cm Final    LVOT Diameter 09/25/2023 2.0  cm Final    LVPWd 09/25/2023 1.0  0.6 - 1.0 cm Final    LVOT Peak Gradient 09/25/2023 3  mmHg Final    LVOT Mean Gradient 09/25/2023 2  mmHg Final    LVOT SV 09/25/2023 52.4  ml Final    LVOT Peak Velocity 09/25/2023 0.9  m/s Final    LVOT VTI 09/25/2023 16.7  cm Final    RVIDd 09/25/2023 3.4  cm Final    LA Volume A/L 09/25/2023 31  mL Final    LA Volume 2C 09/25/2023 33  18 - 58 mL Final    LA Volume 4C 09/25/2023 23  18 - 58 mL Final    AV Area by Peak Velocity 09/25/2023 2.8  cm2 Final    AV Peak Gradient 09/25/2023 4  mmHg Final    AV Peak Velocity 09/25/2023 1.0  m/s Final    MV A Velocity 09/25/2023 0.45  m/s Final    MV E Wave Deceleration Time 09/25/2023 197.9  ms Final    MV E Velocity 09/25/2023 0.54  m/s Final    MV PHT 09/25/2023 57.4  ms Final    MV Area by PHT 09/25/2023 3.8  cm2 Final    Ascending Aorta 09/25/2023 3.0  cm Final    Aortic

## 2024-02-13 ENCOUNTER — OFFICE VISIT (OUTPATIENT)
Age: 54
End: 2024-02-13
Payer: COMMERCIAL

## 2024-02-13 VITALS
DIASTOLIC BLOOD PRESSURE: 92 MMHG | HEART RATE: 101 BPM | BODY MASS INDEX: 25.1 KG/M2 | SYSTOLIC BLOOD PRESSURE: 138 MMHG | WEIGHT: 170 LBS | OXYGEN SATURATION: 98 %

## 2024-02-13 DIAGNOSIS — R00.2 PALPITATION: Primary | ICD-10-CM

## 2024-02-13 PROCEDURE — 99214 OFFICE O/P EST MOD 30 MIN: CPT | Performed by: INTERNAL MEDICINE

## 2024-02-13 NOTE — PATIENT INSTRUCTIONS
Labs  TSH    No appointment required for lab work  *[facility], [address] [place] [address]  ( M - Thur 8am to 3:30pm.) - You must call to make an appointment for blood work at this site.   Contact :[phone]  *[facility] [address] [facility]  *[facility], [address] [place] [address]  *[facility], [address] [facility]  *[facility], [address] [facility]

## 2024-02-13 NOTE — PROGRESS NOTES
[facility]    Freemandayton Franco is 53 y.o. male     Patient is here today for   He denies prior history of MI or CHF  Because of random chest pain and some palpitation and tachycardia, patient underwent event monitoring, echo and NST in 2023  NST in 09/2023, low risk without any obvious ischemia.  Echo in 09/2023, normal EF and no major valvular heart disease  Event monitor in 10/2023, sinus rhythm and no evidence of A-fib or heart block    Patient is denying any chest pain or chest tightness.  Patient continues to have random episode of tachycardia.  He used to be a runner however he has stopped running for the last couple months with tachycardia.  He has random rapid heartbeat which at 120 bpm even with minimal exertion.  No resting tachycardia.  No presyncope or syncope.  He continues to take ADHD medication.  No weight loss or weight gain.  No diaphoresis, diarrhea or constipation  Denies any nausea, vomiting, abdominal pain, fever, chills, sputum production. No hematuria or other bleeding complaints    Past Medical History:   Diagnosis Date    ADHD (attention deficit hyperactivity disorder)     Bipolar 2 disorder (HCC)     Encounter for vasectomy     Hydronephrosis     Kidney stones     TIA (transient ischemic attack)     Ureterolithiasis        Review of Systems:  Cardiac symptoms as noted above in HPI. All others negative.  Denies fatigue, malaise, skin rash, joint pain, blurring vision, photophobia, neck pain, hemoptysis, chronic cough, nausea, vomiting, hematuria, burning micturition, BRBPR, chronic headaches.    Current Outpatient Medications   Medication Sig    lamoTRIgine (LAMICTAL) 200 MG tablet Take 1 tablet by mouth 4 times daily    methylphenidate (RITALIN) 20 MG tablet take 1 tablet by mouth three times a day if needed     No current facility-administered medications for this visit.       Past Surgical History:   Procedure Laterality Date

## 2024-02-13 NOTE — PROGRESS NOTES
Identified pt with two pt identifiers(name and ). Reviewed record in preparation for visit and have obtained necessary documentation.    Freeman Franco presents today for   Chief Complaint   Patient presents with    Follow-up       Pt c/o Denied.             Freeman Franco preferred language for health care discussion is english/other.    Personal Protective Equipment:   Personal Protective Equipment was used including: mask-surgical and hands-gloves. Patient was placed on no precaution(s). Patient was not masked.    Precautions:   Patient currently on None  Patient currently roomed with door closed.    Is someone accompanying this pt? no    Is the patient using any DME equipment during OV? no    Depression Screening:      3/7/2023    11:25 AM 3/2/2023     2:16 PM 2022     9:00 AM 2022     9:00 AM   PHQ-9 Questionaire   Little interest or pleasure in doing things 0 0 0 0   Feeling down, depressed, or hopeless 0 0 0 0   PHQ-9 Total Score 0 0 0 0        Learning Assessment:  No question data found.    Abuse Screenin/13/2024    11:00 AM   AMB Abuse Screening   Do you ever feel afraid of your partner? N   Are you in a relationship with someone who physically or mentally threatens you? N   Is it safe for you to go home? Y          Fall Risk      2024    11:47 AM   Fall Risk   2 or more falls in past year? no   Fall with injury in past year? no         Pt currently taking Anticoagulant therapy? no  Pt currently taking Antiplatelet therapy? no    Coordination of Care:  1. Have you been to the ER, urgent care clinic since your last visit? Hospitalized since your last visit? no    2. Have you seen or consulted any other health care providers outside of the [facility] since your last visit? Include any pap smears or colon screening. no      Please see Red banners under Allergies and Med Rec to remove outside inquires. All correct information has been verified with patient and added to chart.

## 2024-03-07 ENCOUNTER — OFFICE VISIT (OUTPATIENT)
Facility: CLINIC | Age: 54
End: 2024-03-07
Payer: COMMERCIAL

## 2024-03-07 ENCOUNTER — HOSPITAL ENCOUNTER (OUTPATIENT)
Facility: HOSPITAL | Age: 54
Setting detail: SPECIMEN
Discharge: HOME | End: 2024-03-07
Payer: COMMERCIAL

## 2024-03-07 VITALS
HEART RATE: 76 BPM | TEMPERATURE: 97 F | RESPIRATION RATE: 16 BRPM | DIASTOLIC BLOOD PRESSURE: 81 MMHG | OXYGEN SATURATION: 95 % | HEIGHT: 69 IN | SYSTOLIC BLOOD PRESSURE: 119 MMHG | BODY MASS INDEX: 28.58 KG/M2 | WEIGHT: 193 LBS

## 2024-03-07 DIAGNOSIS — Z00.00 ANNUAL PHYSICAL EXAM: Primary | ICD-10-CM

## 2024-03-07 DIAGNOSIS — Z13.1 SCREENING FOR DIABETES MELLITUS (DM): ICD-10-CM

## 2024-03-07 DIAGNOSIS — Z00.00 ANNUAL PHYSICAL EXAM: ICD-10-CM

## 2024-03-07 DIAGNOSIS — Z11.4 SCREENING FOR HIV (HUMAN IMMUNODEFICIENCY VIRUS): ICD-10-CM

## 2024-03-07 LAB
ALBUMIN SERPL-MCNC: 3.8 G/DL (ref 3.4–5)
ALBUMIN/GLOB SERPL: 1.3 (ref 0.8–1.7)
ALP SERPL-CCNC: 102 U/L (ref 45–117)
ALT SERPL-CCNC: 23 U/L (ref 16–61)
ANION GAP SERPL CALC-SCNC: 6 MMOL/L (ref 3–18)
AST SERPL-CCNC: 18 U/L (ref 10–38)
BILIRUB SERPL-MCNC: 0.6 MG/DL (ref 0.2–1)
BUN/CREAT SERPL: 11 (ref 12–20)
CALCIUM SERPL-MCNC: 9.1 MG/DL (ref 8.5–10.1)
CHLORIDE SERPL-SCNC: 110 MMOL/L (ref 100–111)
CHOLEST SERPL-MCNC: 216 MG/DL
CO2 SERPL-SCNC: 27 MMOL/L (ref 21–32)
CREAT SERPL-MCNC: 1.14 MG/DL (ref 0.6–1.3)
ERYTHROCYTE [DISTWIDTH] IN BLOOD BY AUTOMATED COUNT: 12.5 % (ref 11.6–14.5)
EST. AVERAGE GLUCOSE BLD GHB EST-MCNC: 111 MG/DL
GLOBULIN SER CALC-MCNC: 2.9 G/DL (ref 2–4)
GLUCOSE SERPL-MCNC: 104 MG/DL (ref 74–99)
HBA1C MFR BLD: 5.5 % (ref 4.2–5.6)
HCT VFR BLD AUTO: 48.7 % (ref 36–48)
HDLC SERPL-MCNC: 42 MG/DL (ref 40–60)
HDLC SERPL: 5.1 (ref 0–5)
HGB BLD-MCNC: 16.3 G/DL (ref 13–16)
LDLC SERPL CALC-MCNC: 141.8 MG/DL (ref 0–100)
LIPID PANEL: ABNORMAL
MCH RBC QN AUTO: 31.6 PG (ref 24–34)
MCHC RBC AUTO-ENTMCNC: 33.5 G/DL (ref 31–37)
MCV RBC AUTO: 94.4 FL (ref 78–100)
NRBC # BLD: 0 K/UL (ref 0–0.01)
NRBC BLD-RTO: 0 PER 100 WBC
PLATELET # BLD AUTO: 299 K/UL (ref 135–420)
PMV BLD AUTO: 10.9 FL (ref 9.2–11.8)
POTASSIUM SERPL-SCNC: 4.7 MMOL/L (ref 3.5–5.5)
PROT SERPL-MCNC: 6.7 G/DL (ref 6.4–8.2)
RBC # BLD AUTO: 5.16 M/UL (ref 4.35–5.65)
SODIUM SERPL-SCNC: 143 MMOL/L (ref 136–145)
TRIGL SERPL-MCNC: 161 MG/DL
VLDLC SERPL CALC-MCNC: 32.2 MG/DL
WBC # BLD AUTO: 6.3 K/UL (ref 4.6–13.2)

## 2024-03-07 PROCEDURE — 80053 COMPREHEN METABOLIC PANEL: CPT

## 2024-03-07 PROCEDURE — 99396 PREV VISIT EST AGE 40-64: CPT | Performed by: NURSE PRACTITIONER

## 2024-03-07 PROCEDURE — 87389 HIV-1 AG W/HIV-1&-2 AB AG IA: CPT

## 2024-03-07 PROCEDURE — 80061 LIPID PANEL: CPT

## 2024-03-07 PROCEDURE — 85027 COMPLETE CBC AUTOMATED: CPT

## 2024-03-07 PROCEDURE — 83036 HEMOGLOBIN GLYCOSYLATED A1C: CPT

## 2024-03-07 PROCEDURE — 36415 COLL VENOUS BLD VENIPUNCTURE: CPT

## 2024-03-07 SDOH — ECONOMIC STABILITY: INCOME INSECURITY: HOW HARD IS IT FOR YOU TO PAY FOR THE VERY BASICS LIKE FOOD, HOUSING, MEDICAL CARE, AND HEATING?: NOT HARD AT ALL

## 2024-03-07 SDOH — ECONOMIC STABILITY: FOOD INSECURITY: WITHIN THE PAST 12 MONTHS, THE FOOD YOU BOUGHT JUST DIDN'T LAST AND YOU DIDN'T HAVE MONEY TO GET MORE.: NEVER TRUE

## 2024-03-07 SDOH — ECONOMIC STABILITY: FOOD INSECURITY: WITHIN THE PAST 12 MONTHS, YOU WORRIED THAT YOUR FOOD WOULD RUN OUT BEFORE YOU GOT MONEY TO BUY MORE.: NEVER TRUE

## 2024-03-07 ASSESSMENT — ENCOUNTER SYMPTOMS
EYES NEGATIVE: 1
RESPIRATORY NEGATIVE: 1
GASTROINTESTINAL NEGATIVE: 1

## 2024-03-07 ASSESSMENT — PATIENT HEALTH QUESTIONNAIRE - PHQ9
SUM OF ALL RESPONSES TO PHQ QUESTIONS 1-9: 0
SUM OF ALL RESPONSES TO PHQ9 QUESTIONS 1 & 2: 0
3. TROUBLE FALLING OR STAYING ASLEEP: 0
2. FEELING DOWN, DEPRESSED OR HOPELESS: 0
1. LITTLE INTEREST OR PLEASURE IN DOING THINGS: 0
SUM OF ALL RESPONSES TO PHQ QUESTIONS 1-9: 0
7. TROUBLE CONCENTRATING ON THINGS, SUCH AS READING THE NEWSPAPER OR WATCHING TELEVISION: 0
8. MOVING OR SPEAKING SO SLOWLY THAT OTHER PEOPLE COULD HAVE NOTICED. OR THE OPPOSITE, BEING SO FIGETY OR RESTLESS THAT YOU HAVE BEEN MOVING AROUND A LOT MORE THAN USUAL: 0
4. FEELING TIRED OR HAVING LITTLE ENERGY: 0
9. THOUGHTS THAT YOU WOULD BE BETTER OFF DEAD, OR OF HURTING YOURSELF: 0
SUM OF ALL RESPONSES TO PHQ QUESTIONS 1-9: 0
SUM OF ALL RESPONSES TO PHQ QUESTIONS 1-9: 0
10. IF YOU CHECKED OFF ANY PROBLEMS, HOW DIFFICULT HAVE THESE PROBLEMS MADE IT FOR YOU TO DO YOUR WORK, TAKE CARE OF THINGS AT HOME, OR GET ALONG WITH OTHER PEOPLE: 0
5. POOR APPETITE OR OVEREATING: 0
6. FEELING BAD ABOUT YOURSELF - OR THAT YOU ARE A FAILURE OR HAVE LET YOURSELF OR YOUR FAMILY DOWN: 0

## 2024-03-07 NOTE — PROGRESS NOTES
Freeman Franco is a 53 y.o. male (: 1970) presenting to address:    Chief Complaint   Patient presents with    Annual Exam       Vitals:    24 0828   Resp: 16       Coordination of Care Questionaire:   1. \"Have you been to the ER, urgent care clinic since your last visit?  Hospitalized since your last visit?\" No    2. \"Have you seen or consulted any other health care providers outside of the [facility] since your last visit?\" No     3. For patients aged 45-75: Has the patient had a colonoscopy / FIT/ Cologuard? Yes      If the patient is female:    4. For patients aged 40-74: Has the patient had a mammogram within the past 2 years? No      5. For patients aged 21-65: Has the patient had a pap smear? No    Advanced Directive:   1. Do you have an Advanced Directive? No    2. Would you like information on Advanced Directives? no  
tenderness.   Musculoskeletal:         General: Normal range of motion.      Cervical back: Normal range of motion.   Skin:     General: Skin is warm and dry.   Neurological:      General: No focal deficit present.      Mental Status: He is alert and oriented to person, place, and time.   Psychiatric:         Mood and Affect: Mood normal.         Behavior: Behavior normal.         Thought Content: Thought content normal.         Judgment: Judgment normal.                 I have discussed the diagnosis with the patient and the intended plan as seen in the above orders.  The patient has received an After-Visit Summary and questions were answered concerning future plans.     An After Visit Summary was printed and given to the patient.    All diagnosis have been discussed with the patient and all of the patient's questions have been answered.           Liset Najera, LAURA-BC  [facility]  [address] [address]

## 2024-03-07 NOTE — PATIENT INSTRUCTIONS
much sun, wash your hands, brush your teeth twice a day, and wear a seat belt in the car.   Where can you learn more?  Go to https://www.[employer].net/patientEd and enter P072 to learn more about \"Well Visit, Ages 18 to 65: Care Instructions.\"  Current as of: August 6, 2023               Content Version: 14.0  © 4494-8601 [employer].   Care instructions adapted under license by [employer]. If you have questions about a medical condition or this instruction, always ask your healthcare professional. [employer] disclaims any warranty or liability for your use of this information.

## 2024-03-08 LAB
HIV 1+2 AB+HIV1 P24 AG SERPL QL IA: NONREACTIVE
HIV 1/2 RESULT COMMENT: NORMAL

## 2024-03-26 NOTE — PROGRESS NOTES
[facility]    Freeman Franco is a 53 y.o. male, pmhx of HTN and tachycardia     Patient with hx of chest pain for which he underwent a nuclear stress test in 09/2023 which was low risk. Echo with normal LVEF and no major valvular pathology. He also endorsed palpitations and tachycardia. His event monitor in 10/2023 had no evidence of Afib.     Presents to the office today for follow up. He has been monitoring his BP and HR at home. He will often have episodes of resting tachycardia in the low 100s. Sometimes his HR will go up to the 120s with just walking up stairs, after which he feels fatigued. Sometimes his HR will sustain above 100 bpm throughout the day. Denies CP, SOB, diaphoresis, N/V/D,  palpitations, near syncope or syncope, dizziness.     Past Medical History:   Diagnosis Date    ADHD (attention deficit hyperactivity disorder)     Bipolar 2 disorder (HCC)     Encounter for vasectomy     Hydronephrosis     Kidney stones     TIA (transient ischemic attack)     Ureterolithiasis          Past Surgical History:   Procedure Laterality Date    COLONOSCOPY      2013    [facility]       Current Outpatient Medications   Medication Sig    lamoTRIgine (LAMICTAL) 200 MG tablet Take 1 tablet by mouth 4 times daily    methylphenidate (RITALIN) 20 MG tablet take 1 tablet by mouth three times a day if needed     No current facility-administered medications for this visit.       Allergies and Sensitivities:  No Known Allergies    Family History:  Family History   Problem Relation Age of Onset    Hypertension Mother     Stroke Father     Cancer Father         thyroid    Heart Failure Father     Cancer Paternal Grandfather         colon       Social History:  Social History     Tobacco Use    Smoking status: Former     Current packs/day: 0.00     Average packs/day: 1 pack/day for 12.0 years (12.0 ttl pk-yrs)     Types: Cigarettes     Start date: 1/1/1993     Quit date: 1/1/2005

## 2024-03-28 ENCOUNTER — OFFICE VISIT (OUTPATIENT)
Age: 54
End: 2024-03-28
Payer: COMMERCIAL

## 2024-03-28 VITALS
DIASTOLIC BLOOD PRESSURE: 85 MMHG | HEIGHT: 69 IN | OXYGEN SATURATION: 98 % | HEART RATE: 75 BPM | SYSTOLIC BLOOD PRESSURE: 126 MMHG | WEIGHT: 195 LBS | RESPIRATION RATE: 16 BRPM | BODY MASS INDEX: 28.88 KG/M2

## 2024-03-28 DIAGNOSIS — F90.9 ATTENTION DEFICIT HYPERACTIVITY DISORDER (ADHD), UNSPECIFIED ADHD TYPE: Primary | ICD-10-CM

## 2024-03-28 DIAGNOSIS — I10 PRIMARY HYPERTENSION: ICD-10-CM

## 2024-03-28 DIAGNOSIS — R00.0 TACHYCARDIA: ICD-10-CM

## 2024-03-28 PROCEDURE — 3074F SYST BP LT 130 MM HG: CPT | Performed by: PHYSICIAN ASSISTANT

## 2024-03-28 PROCEDURE — 3079F DIAST BP 80-89 MM HG: CPT | Performed by: PHYSICIAN ASSISTANT

## 2024-03-28 PROCEDURE — 99214 OFFICE O/P EST MOD 30 MIN: CPT | Performed by: PHYSICIAN ASSISTANT

## 2024-03-28 RX ORDER — METOPROLOL SUCCINATE 25 MG/1
25 TABLET, EXTENDED RELEASE ORAL DAILY
Qty: 30 TABLET | Refills: 3 | Status: SHIPPED | OUTPATIENT
Start: 2024-03-28

## 2024-03-28 ASSESSMENT — PATIENT HEALTH QUESTIONNAIRE - PHQ9
SUM OF ALL RESPONSES TO PHQ QUESTIONS 1-9: 0
SUM OF ALL RESPONSES TO PHQ QUESTIONS 1-9: 0
10. IF YOU CHECKED OFF ANY PROBLEMS, HOW DIFFICULT HAVE THESE PROBLEMS MADE IT FOR YOU TO DO YOUR WORK, TAKE CARE OF THINGS AT HOME, OR GET ALONG WITH OTHER PEOPLE: NOT DIFFICULT AT ALL
6. FEELING BAD ABOUT YOURSELF - OR THAT YOU ARE A FAILURE OR HAVE LET YOURSELF OR YOUR FAMILY DOWN: NOT AT ALL
1. LITTLE INTEREST OR PLEASURE IN DOING THINGS: NOT AT ALL
SUM OF ALL RESPONSES TO PHQ9 QUESTIONS 1 & 2: 0
SUM OF ALL RESPONSES TO PHQ QUESTIONS 1-9: 0
SUM OF ALL RESPONSES TO PHQ9 QUESTIONS 1 & 2: 0
7. TROUBLE CONCENTRATING ON THINGS, SUCH AS READING THE NEWSPAPER OR WATCHING TELEVISION: NOT AT ALL
5. POOR APPETITE OR OVEREATING: NOT AT ALL
SUM OF ALL RESPONSES TO PHQ QUESTIONS 1-9: 0
3. TROUBLE FALLING OR STAYING ASLEEP: NOT AT ALL
8. MOVING OR SPEAKING SO SLOWLY THAT OTHER PEOPLE COULD HAVE NOTICED. OR THE OPPOSITE, BEING SO FIGETY OR RESTLESS THAT YOU HAVE BEEN MOVING AROUND A LOT MORE THAN USUAL: NOT AT ALL
1. LITTLE INTEREST OR PLEASURE IN DOING THINGS: NOT AT ALL
SUM OF ALL RESPONSES TO PHQ QUESTIONS 1-9: 0
9. THOUGHTS THAT YOU WOULD BE BETTER OFF DEAD, OR OF HURTING YOURSELF: NOT AT ALL
SUM OF ALL RESPONSES TO PHQ QUESTIONS 1-9: 0
2. FEELING DOWN, DEPRESSED OR HOPELESS: NOT AT ALL
4. FEELING TIRED OR HAVING LITTLE ENERGY: NOT AT ALL
2. FEELING DOWN, DEPRESSED OR HOPELESS: NOT AT ALL

## 2024-03-28 NOTE — PROGRESS NOTES
Identified pt with two pt identifiers(name and ). Reviewed record in preparation for visit and have obtained necessary documentation.    Freeman Franco presents today for   Chief Complaint   Patient presents with    Follow-up     1 month       Pt c/o NONE            Freeman Franco preferred language for health care discussion is english/other.    Personal Protective Equipment:   Personal Protective Equipment was used including: mask-surgical and hands-gloves. Patient was placed on no precaution(s). Patient was not masked.    Precautions:   Patient currently on None  Patient currently roomed with door closed.    Is someone accompanying this pt? no    Is the patient using any DME equipment during OV? no    Depression Screening:      3/28/2024     8:23 AM 3/28/2024     8:22 AM 3/7/2024     8:34 AM 3/7/2023    11:25 AM 3/2/2023     2:16 PM 2022     9:00 AM 2022     9:00 AM   PHQ-9 Questionaire   Little interest or pleasure in doing things 0 0 0 0 0 0 0   Feeling down, depressed, or hopeless 0 0 0 0 0 0 0   Trouble falling or staying asleep, or sleeping too much  0 0       Feeling tired or having little energy  0 0       Poor appetite or overeating  0 0       Feeling bad about yourself - or that you are a failure or have let yourself or your family down  0 0       Trouble concentrating on things, such as reading the newspaper or watching television  0 0       Moving or speaking so slowly that other people could have noticed. Or the opposite - being so fidgety or restless that you have been moving around a lot more than usual  0 0       Thoughts that you would be better off dead, or of hurting yourself in some way  0 0       PHQ-9 Total Score 0 0 0 0 0 0 0   If you checked off any problems, how difficult have these problems made it for you to do your work, take care of things at home, or get along with other people?  0 0            Learning Assessment:  Who is the primary learner? Patient    What is the preferred

## 2024-03-28 NOTE — PATIENT INSTRUCTIONS
New Medication/Medication Changes    Toprol 25 mg xl daily   **please allow 24-48 hrs for medication to be escribed to pharmacy** If you need any refills on medications please contact your pharmacy so that the request can be escribed to the provider for review seven to 10 days prior to being out of medication.

## 2024-08-27 RX ORDER — METOPROLOL SUCCINATE 25 MG/1
25 TABLET, EXTENDED RELEASE ORAL DAILY
Qty: 90 TABLET | Refills: 3 | Status: SHIPPED | OUTPATIENT
Start: 2024-08-27

## 2024-10-03 ENCOUNTER — OFFICE VISIT (OUTPATIENT)
Age: 54
End: 2024-10-03
Payer: COMMERCIAL

## 2024-10-03 VITALS
SYSTOLIC BLOOD PRESSURE: 115 MMHG | HEIGHT: 69 IN | DIASTOLIC BLOOD PRESSURE: 80 MMHG | HEART RATE: 74 BPM | OXYGEN SATURATION: 98 % | WEIGHT: 195 LBS | BODY MASS INDEX: 28.88 KG/M2

## 2024-10-03 DIAGNOSIS — I10 PRIMARY HYPERTENSION: ICD-10-CM

## 2024-10-03 DIAGNOSIS — F90.9 ATTENTION DEFICIT HYPERACTIVITY DISORDER (ADHD), UNSPECIFIED ADHD TYPE: ICD-10-CM

## 2024-10-03 DIAGNOSIS — R00.2 PALPITATION: Primary | ICD-10-CM

## 2024-10-03 DIAGNOSIS — R00.0 TACHYCARDIA: ICD-10-CM

## 2024-10-03 DIAGNOSIS — R07.9 CHEST PAIN, UNSPECIFIED TYPE: ICD-10-CM

## 2024-10-03 PROCEDURE — 99214 OFFICE O/P EST MOD 30 MIN: CPT | Performed by: INTERNAL MEDICINE

## 2024-10-03 PROCEDURE — 3074F SYST BP LT 130 MM HG: CPT | Performed by: INTERNAL MEDICINE

## 2024-10-03 PROCEDURE — 3079F DIAST BP 80-89 MM HG: CPT | Performed by: INTERNAL MEDICINE

## 2024-10-03 PROCEDURE — 93000 ELECTROCARDIOGRAM COMPLETE: CPT | Performed by: INTERNAL MEDICINE

## 2024-10-03 ASSESSMENT — PATIENT HEALTH QUESTIONNAIRE - PHQ9

## 2024-10-03 NOTE — PROGRESS NOTES
Freeman Franco presents today for   Chief Complaint   Patient presents with    Follow-up     6 months       Freeman Franco preferred language for health care discussion is english/other.    Is someone accompanying this pt? no    Is the patient using any DME equipment during OV? no    Depression Screening:  Depression: Not at risk (10/3/2024)    PHQ-2     PHQ-2 Score: 0        Learning Assessment:  Who is the primary learner? Patient    What is the preferred language for health care of the primary learner? ENGLISH    How does the primary learner prefer to learn new concepts? DEMONSTRATION    Answered By patient    Relationship to Learner SELF           Pt currently taking Anticoagulant therapy? no    Pt currently taking Antiplatelet therapy ? no      Coordination of Care:  1. Have you been to the ER, urgent care clinic since your last visit? Hospitalized since your last visit? no    2. Have you seen or consulted any other health care providers outside of the [facility] since your last visit? Include any pap smears or colon screening. no    
   CO2 27 03/07/2024 09:20 AM    BUN 12 03/07/2024 09:20 AM         Latest Ref Rng & Units 3/7/2024     9:20 AM 3/7/2023    11:28 AM 4/7/2022    11:14 AM 7/15/2021     9:38 AM   Lipids   Chol <200 MG/   191  214    HDL 40 - 60 MG/DL 42   41  42    LDL Calc 0 - 100 MG/.8   110.8  146.6    VLDL Calc MG/DL 32.2   39.2  25.4    Trig <150 MG/   196  127    Ratio 0 - 5.0 5.1   4.7  5.1    ALT 16 - 61 U/L 23  30  20  22    AST 10 - 38 U/L 18  20  16  16    LDL 0 - 100 MG/.8   110.8  146.6      Lab Results   Component Value Date/Time    ALT 23 03/07/2024 09:20 AM     No results found for: \"HBA1C\", \"[id number]\"    ECHO (TTE) COMPLETE (CONTRAST/BUBBLE/3D PRN) 09/25/2023 10:59 AM (Final)  Interpretation Summary    Left Ventricle: Normal left ventricular systolic function with a visually estimated EF of 55 - 60%. Left ventricle size is normal. Normal wall thickness. Normal wall motion. Normal diastolic function.    Aortic Valve: No stenosis.    Mitral Valve: No regurgitation.    Tricuspid Valve: No regurgitation.    NM STRESS TEST WITH MYOCARDIAL PERFUSION 09/26/2023  3:22 PM (Final)  Interpretation Summary    Stress Combined Conclusion: Findings suggest a low risk of cardiac events.    Stress Function: Left ventricular function post-stress is normal. Post-stress ejection fraction is 68%.    Perfusion Comments: LV perfusion is normal.    Perfusion Defect: There is a mild severity left ventricular stress perfusion defect that is small in size present in the basal inferoseptal segment(s). There is normal wall motion in the defect area. The defect appears to be an artifact.    Perfusion Conclusion: There is no evidence of transient ischemic dilation (TID). TID ratio is 0.70.    ECG: Resting ECG demonstrates normal sinus rhythm.    ECG: The ECG was negative for ischemia.    Stress Test: The patient reported no symptoms during the stress test. Hemodynamics are adequate for diagnosis. Blood pressure demonstrated 
I will SWITCH the dose or number of times a day I take the medications listed below when I get home from the hospital:  None